# Patient Record
Sex: FEMALE | Race: WHITE | NOT HISPANIC OR LATINO | URBAN - METROPOLITAN AREA
[De-identification: names, ages, dates, MRNs, and addresses within clinical notes are randomized per-mention and may not be internally consistent; named-entity substitution may affect disease eponyms.]

---

## 2020-12-28 ENCOUNTER — OUTPATIENT (OUTPATIENT)
Dept: OUTPATIENT SERVICES | Facility: HOSPITAL | Age: 60
LOS: 1 days | End: 2020-12-28
Payer: COMMERCIAL

## 2020-12-28 DIAGNOSIS — I48.91 UNSPECIFIED ATRIAL FIBRILLATION: ICD-10-CM

## 2020-12-28 PROCEDURE — 93227 XTRNL ECG REC<48 HR R&I: CPT

## 2020-12-30 PROCEDURE — 93225 XTRNL ECG REC<48 HRS REC: CPT

## 2021-09-20 PROBLEM — Z00.00 ENCOUNTER FOR PREVENTIVE HEALTH EXAMINATION: Status: ACTIVE | Noted: 2021-09-20

## 2021-09-24 ENCOUNTER — TRANSCRIPTION ENCOUNTER (OUTPATIENT)
Age: 61
End: 2021-09-24

## 2021-09-24 ENCOUNTER — NON-APPOINTMENT (OUTPATIENT)
Age: 61
End: 2021-09-24

## 2021-09-24 ENCOUNTER — APPOINTMENT (OUTPATIENT)
Dept: HEART AND VASCULAR | Facility: CLINIC | Age: 61
End: 2021-09-24
Payer: COMMERCIAL

## 2021-09-24 VITALS
OXYGEN SATURATION: 98 % | WEIGHT: 228.99 LBS | TEMPERATURE: 98.1 F | BODY MASS INDEX: 31.02 KG/M2 | SYSTOLIC BLOOD PRESSURE: 118 MMHG | DIASTOLIC BLOOD PRESSURE: 84 MMHG | HEIGHT: 72 IN | HEART RATE: 53 BPM

## 2021-09-24 PROCEDURE — 93000 ELECTROCARDIOGRAM COMPLETE: CPT

## 2021-09-24 PROCEDURE — 99203 OFFICE O/P NEW LOW 30 MIN: CPT

## 2021-09-24 NOTE — HISTORY OF PRESENT ILLNESS
[FreeTextEntry1] : 60 F hypothyroidism\par \par Had episode of palpitations Dec 2020 that lasted 4 hours, noted on her pulse ox that her hr was jumping up and down.  Saw a cardiologist in January, did a 48 hour holter no afib or any sustained arrythmias.  No echo or stress testing done.  Feels generalized fatigue, notes this has gotten worse over the last 3 months.  Feels very stressed due to her job and taking care of her brother who has mental illness. Notes chronic chest tightness, thinks it is due to stress.  Notes occasional brief palpitations.  No sob.  Can walk > 2 flights of stairs, no limitations, though says she gets tired and needs to rest after 3 flights.  \par \par Fhx: Mother CHF, Father CAD age 60\par Non smoker\par Social etoh use\par \par NKDA

## 2021-09-24 NOTE — ASSESSMENT
[FreeTextEntry1] : 60 F \par \par Palpitations \par Sinus Genny\par Mild obesity\par EKG: sinus genny rate 53, low voltage limb leads\par \par - 72 hour holter \par - ECHO\par - obtain recent blood work\par - encouraged more exercise, attempts at weight loss\par - further testing pending results of echo/holter

## 2021-09-29 ENCOUNTER — APPOINTMENT (OUTPATIENT)
Dept: HEART AND VASCULAR | Facility: CLINIC | Age: 61
End: 2021-09-29
Payer: COMMERCIAL

## 2021-09-29 ENCOUNTER — NON-APPOINTMENT (OUTPATIENT)
Age: 61
End: 2021-09-29

## 2021-09-29 PROCEDURE — 93306 TTE W/DOPPLER COMPLETE: CPT

## 2022-01-24 ENCOUNTER — TRANSCRIPTION ENCOUNTER (OUTPATIENT)
Age: 62
End: 2022-01-24

## 2022-01-24 ENCOUNTER — INPATIENT (INPATIENT)
Facility: HOSPITAL | Age: 62
LOS: 0 days | Discharge: HOME | DRG: 310 | End: 2022-01-24
Attending: INTERNAL MEDICINE | Admitting: INTERNAL MEDICINE
Payer: COMMERCIAL

## 2022-01-24 VITALS
RESPIRATION RATE: 18 BRPM | WEIGHT: 229.94 LBS | DIASTOLIC BLOOD PRESSURE: 78 MMHG | SYSTOLIC BLOOD PRESSURE: 166 MMHG | HEART RATE: 125 BPM | OXYGEN SATURATION: 97 % | TEMPERATURE: 98 F

## 2022-01-24 VITALS — TEMPERATURE: 98 F

## 2022-01-24 DIAGNOSIS — I10 ESSENTIAL (PRIMARY) HYPERTENSION: ICD-10-CM

## 2022-01-24 DIAGNOSIS — E03.9 HYPOTHYROIDISM, UNSPECIFIED: ICD-10-CM

## 2022-01-24 DIAGNOSIS — I48.91 UNSPECIFIED ATRIAL FIBRILLATION: ICD-10-CM

## 2022-01-24 LAB
ALBUMIN SERPL ELPH-MCNC: 4.4 G/DL — SIGNIFICANT CHANGE UP (ref 3.3–5)
ALP SERPL-CCNC: 112 U/L — SIGNIFICANT CHANGE UP (ref 40–120)
ALT FLD-CCNC: 17 U/L — SIGNIFICANT CHANGE UP (ref 10–45)
ANION GAP SERPL CALC-SCNC: 15 MMOL/L — SIGNIFICANT CHANGE UP (ref 5–17)
APTT BLD: 31.5 SEC — SIGNIFICANT CHANGE UP (ref 27.5–35.5)
AST SERPL-CCNC: 21 U/L — SIGNIFICANT CHANGE UP (ref 10–40)
BILIRUB SERPL-MCNC: 0.2 MG/DL — SIGNIFICANT CHANGE UP (ref 0.2–1.2)
BLD GP AB SCN SERPL QL: NEGATIVE — SIGNIFICANT CHANGE UP
BUN SERPL-MCNC: 20 MG/DL — SIGNIFICANT CHANGE UP (ref 7–23)
CALCIUM SERPL-MCNC: 8.9 MG/DL — SIGNIFICANT CHANGE UP (ref 8.4–10.5)
CHLORIDE SERPL-SCNC: 106 MMOL/L — SIGNIFICANT CHANGE UP (ref 96–108)
CK MB CFR SERPL CALC: 2.8 NG/ML — SIGNIFICANT CHANGE UP (ref 0–6.7)
CK SERPL-CCNC: 114 U/L — SIGNIFICANT CHANGE UP (ref 25–170)
CO2 SERPL-SCNC: 21 MMOL/L — LOW (ref 22–31)
CREAT SERPL-MCNC: 1.01 MG/DL — SIGNIFICANT CHANGE UP (ref 0.5–1.3)
GLUCOSE SERPL-MCNC: 159 MG/DL — HIGH (ref 70–99)
HCT VFR BLD CALC: 39.6 % — SIGNIFICANT CHANGE UP (ref 34.5–45)
HGB BLD-MCNC: 13.2 G/DL — SIGNIFICANT CHANGE UP (ref 11.5–15.5)
INR BLD: 0.96 — SIGNIFICANT CHANGE UP (ref 0.88–1.16)
MCHC RBC-ENTMCNC: 29.4 PG — SIGNIFICANT CHANGE UP (ref 27–34)
MCHC RBC-ENTMCNC: 33.3 GM/DL — SIGNIFICANT CHANGE UP (ref 32–36)
MCV RBC AUTO: 88.2 FL — SIGNIFICANT CHANGE UP (ref 80–100)
NRBC # BLD: 0 /100 WBCS — SIGNIFICANT CHANGE UP (ref 0–0)
PLATELET # BLD AUTO: 357 K/UL — SIGNIFICANT CHANGE UP (ref 150–400)
POTASSIUM SERPL-MCNC: 3.4 MMOL/L — LOW (ref 3.5–5.3)
POTASSIUM SERPL-SCNC: 3.4 MMOL/L — LOW (ref 3.5–5.3)
PROT SERPL-MCNC: 7.5 G/DL — SIGNIFICANT CHANGE UP (ref 6–8.3)
PROTHROM AB SERPL-ACNC: 11.5 SEC — SIGNIFICANT CHANGE UP (ref 10.6–13.6)
RBC # BLD: 4.49 M/UL — SIGNIFICANT CHANGE UP (ref 3.8–5.2)
RBC # FLD: 12.9 % — SIGNIFICANT CHANGE UP (ref 10.3–14.5)
RH IG SCN BLD-IMP: POSITIVE — SIGNIFICANT CHANGE UP
SARS-COV-2 RNA SPEC QL NAA+PROBE: NEGATIVE — SIGNIFICANT CHANGE UP
SODIUM SERPL-SCNC: 142 MMOL/L — SIGNIFICANT CHANGE UP (ref 135–145)
TROPONIN T SERPL-MCNC: 0.01 NG/ML — SIGNIFICANT CHANGE UP (ref 0–0.01)
TSH SERPL-MCNC: 0.12 UIU/ML — LOW (ref 0.27–4.2)
WBC # BLD: 8.54 K/UL — SIGNIFICANT CHANGE UP (ref 3.8–10.5)
WBC # FLD AUTO: 8.54 K/UL — SIGNIFICANT CHANGE UP (ref 3.8–10.5)

## 2022-01-24 PROCEDURE — 82553 CREATINE MB FRACTION: CPT

## 2022-01-24 PROCEDURE — 85027 COMPLETE CBC AUTOMATED: CPT

## 2022-01-24 PROCEDURE — 86900 BLOOD TYPING SEROLOGIC ABO: CPT

## 2022-01-24 PROCEDURE — 80061 LIPID PANEL: CPT

## 2022-01-24 PROCEDURE — 86850 RBC ANTIBODY SCREEN: CPT

## 2022-01-24 PROCEDURE — 71045 X-RAY EXAM CHEST 1 VIEW: CPT | Mod: 26

## 2022-01-24 PROCEDURE — 93010 ELECTROCARDIOGRAM REPORT: CPT

## 2022-01-24 PROCEDURE — 93005 ELECTROCARDIOGRAM TRACING: CPT

## 2022-01-24 PROCEDURE — 99238 HOSP IP/OBS DSCHRG MGMT 30/<: CPT

## 2022-01-24 PROCEDURE — 71045 X-RAY EXAM CHEST 1 VIEW: CPT

## 2022-01-24 PROCEDURE — 99291 CRITICAL CARE FIRST HOUR: CPT

## 2022-01-24 PROCEDURE — 86901 BLOOD TYPING SEROLOGIC RH(D): CPT

## 2022-01-24 PROCEDURE — 82550 ASSAY OF CK (CPK): CPT

## 2022-01-24 PROCEDURE — 99285 EMERGENCY DEPT VISIT HI MDM: CPT

## 2022-01-24 PROCEDURE — 84484 ASSAY OF TROPONIN QUANT: CPT

## 2022-01-24 PROCEDURE — 84439 ASSAY OF FREE THYROXINE: CPT

## 2022-01-24 PROCEDURE — 36415 COLL VENOUS BLD VENIPUNCTURE: CPT

## 2022-01-24 PROCEDURE — 84443 ASSAY THYROID STIM HORMONE: CPT

## 2022-01-24 PROCEDURE — 87635 SARS-COV-2 COVID-19 AMP PRB: CPT

## 2022-01-24 PROCEDURE — 85610 PROTHROMBIN TIME: CPT

## 2022-01-24 PROCEDURE — 85730 THROMBOPLASTIN TIME PARTIAL: CPT

## 2022-01-24 PROCEDURE — 80053 COMPREHEN METABOLIC PANEL: CPT

## 2022-01-24 PROCEDURE — 83036 HEMOGLOBIN GLYCOSYLATED A1C: CPT

## 2022-01-24 RX ORDER — HEPARIN SODIUM 5000 [USP'U]/ML
4000 INJECTION INTRAVENOUS; SUBCUTANEOUS EVERY 6 HOURS
Refills: 0 | Status: DISCONTINUED | OUTPATIENT
Start: 2022-01-24 | End: 2022-01-24

## 2022-01-24 RX ORDER — POTASSIUM CHLORIDE 20 MEQ
40 PACKET (EA) ORAL ONCE
Refills: 0 | Status: COMPLETED | OUTPATIENT
Start: 2022-01-24 | End: 2022-01-24

## 2022-01-24 RX ORDER — HEPARIN SODIUM 5000 [USP'U]/ML
8500 INJECTION INTRAVENOUS; SUBCUTANEOUS EVERY 6 HOURS
Refills: 0 | Status: DISCONTINUED | OUTPATIENT
Start: 2022-01-24 | End: 2022-01-24

## 2022-01-24 RX ORDER — LOSARTAN POTASSIUM 100 MG/1
25 TABLET, FILM COATED ORAL DAILY
Refills: 0 | Status: DISCONTINUED | OUTPATIENT
Start: 2022-01-24 | End: 2022-01-24

## 2022-01-24 RX ORDER — METOPROLOL TARTRATE 50 MG
5 TABLET ORAL ONCE
Refills: 0 | Status: COMPLETED | OUTPATIENT
Start: 2022-01-24 | End: 2022-01-24

## 2022-01-24 RX ORDER — AMLODIPINE BESYLATE 2.5 MG/1
5 TABLET ORAL EVERY 24 HOURS
Refills: 0 | Status: DISCONTINUED | OUTPATIENT
Start: 2022-01-24 | End: 2022-01-24

## 2022-01-24 RX ORDER — APIXABAN 2.5 MG/1
5 TABLET, FILM COATED ORAL EVERY 12 HOURS
Refills: 0 | Status: DISCONTINUED | OUTPATIENT
Start: 2022-01-24 | End: 2022-01-24

## 2022-01-24 RX ORDER — HEPARIN SODIUM 5000 [USP'U]/ML
INJECTION INTRAVENOUS; SUBCUTANEOUS
Qty: 25000 | Refills: 0 | Status: DISCONTINUED | OUTPATIENT
Start: 2022-01-24 | End: 2022-01-24

## 2022-01-24 RX ORDER — LEVOTHYROXINE SODIUM 125 MCG
175 TABLET ORAL EVERY 24 HOURS
Refills: 0 | Status: DISCONTINUED | OUTPATIENT
Start: 2022-01-24 | End: 2022-01-24

## 2022-01-24 RX ORDER — METOPROLOL TARTRATE 50 MG
0.5 TABLET ORAL
Qty: 30 | Refills: 0
Start: 2022-01-24 | End: 2022-02-22

## 2022-01-24 RX ORDER — METOPROLOL TARTRATE 50 MG
12.5 TABLET ORAL
Refills: 0 | Status: DISCONTINUED | OUTPATIENT
Start: 2022-01-24 | End: 2022-01-24

## 2022-01-24 RX ORDER — HEPARIN SODIUM 5000 [USP'U]/ML
8500 INJECTION INTRAVENOUS; SUBCUTANEOUS ONCE
Refills: 0 | Status: COMPLETED | OUTPATIENT
Start: 2022-01-24 | End: 2022-01-24

## 2022-01-24 RX ORDER — SODIUM CHLORIDE 9 MG/ML
1000 INJECTION INTRAMUSCULAR; INTRAVENOUS; SUBCUTANEOUS ONCE
Refills: 0 | Status: COMPLETED | OUTPATIENT
Start: 2022-01-24 | End: 2022-01-24

## 2022-01-24 RX ORDER — ATORVASTATIN CALCIUM 80 MG/1
20 TABLET, FILM COATED ORAL AT BEDTIME
Refills: 0 | Status: DISCONTINUED | OUTPATIENT
Start: 2022-01-24 | End: 2022-01-24

## 2022-01-24 RX ORDER — ATORVASTATIN CALCIUM 80 MG/1
1 TABLET, FILM COATED ORAL
Qty: 30 | Refills: 0
Start: 2022-01-24 | End: 2022-02-22

## 2022-01-24 RX ORDER — LOSARTAN POTASSIUM 100 MG/1
1 TABLET, FILM COATED ORAL
Qty: 30 | Refills: 0
Start: 2022-01-24 | End: 2022-02-22

## 2022-01-24 RX ORDER — APIXABAN 2.5 MG/1
1 TABLET, FILM COATED ORAL
Qty: 60 | Refills: 0
Start: 2022-01-24 | End: 2022-02-22

## 2022-01-24 RX ADMIN — HEPARIN SODIUM 1800 UNIT(S)/HR: 5000 INJECTION INTRAVENOUS; SUBCUTANEOUS at 04:39

## 2022-01-24 RX ADMIN — HEPARIN SODIUM 8500 UNIT(S): 5000 INJECTION INTRAVENOUS; SUBCUTANEOUS at 04:38

## 2022-01-24 RX ADMIN — Medication 175 MICROGRAM(S): at 07:53

## 2022-01-24 RX ADMIN — SODIUM CHLORIDE 1000 MILLILITER(S): 9 INJECTION INTRAMUSCULAR; INTRAVENOUS; SUBCUTANEOUS at 04:38

## 2022-01-24 RX ADMIN — LOSARTAN POTASSIUM 25 MILLIGRAM(S): 100 TABLET, FILM COATED ORAL at 07:53

## 2022-01-24 RX ADMIN — Medication 40 MILLIEQUIVALENT(S): at 06:15

## 2022-01-24 RX ADMIN — APIXABAN 5 MILLIGRAM(S): 2.5 TABLET, FILM COATED ORAL at 07:53

## 2022-01-24 RX ADMIN — Medication 5 MILLIGRAM(S): at 04:37

## 2022-01-24 NOTE — ED ADULT NURSE NOTE - OBJECTIVE STATEMENT
61 y F, complaining of palpitations, pt states she woke up about 1 hour ago and began to feel palpitations, pt states she continues to feel palpitations and this has occurred before. pt states she follows cardiologist in [place] and was told she has Afib but not given any blood thinner medications. pt states she was prescribed many cardiac meds but her heart rate became to low and she stopped taking medications. Pt denies any chest pain but states she just feels palpitations

## 2022-01-24 NOTE — H&P ADULT - NSHPLABSRESULTS_GEN_ALL_CORE
13.2   8.54  )-----------( 357      ( 24 Jan 2022 03:49 )             39.6       01-24    142  |  106  |  20  ----------------------------<  159<H>  3.4<L>   |  21<L>  |  1.01    Ca    8.9      24 Jan 2022 03:49    TPro  7.5  /  Alb  4.4  /  TBili  0.2  /  DBili  x   /  AST  21  /  ALT  17  /  AlkPhos  112  01-24      PT/INR - ( 24 Jan 2022 04:03 )   PT: 11.5 sec;   INR: 0.96          PTT - ( 24 Jan 2022 04:03 )  PTT:31.5 sec    CARDIAC MARKERS ( 24 Jan 2022 03:49 )  x     / 0.01 ng/mL / 114 U/L / x     / 2.8 ng/mL    EKG: Afib 116bpm, nonspecific ST-T wave abnormality

## 2022-01-24 NOTE — DISCHARGE NOTE PROVIDER - NSDCCPCAREPLAN_GEN_ALL_CORE_FT
PRINCIPAL DISCHARGE DIAGNOSIS  Diagnosis: Afib  Assessment and Plan of Treatment: - You came into the hospital with complaints of shortness of breath and palpitations. You were found to be in a fast and irregular heart rhythm called atrial fibrillation. In people with A-fib, the electrical signals that control the heartbeat are abnormal. As a result, the top 2 chambers of the heart stop pumping effectively, and a small amount of the blood that should move out of these chambers gets left behind. As the blood pools, it can start to form clots. These clots can travel to the brain through the blood vessels, and cause strokes.  - You are to start Eliquis 5mg twice a day to prevent blood clots from forming. You will also start Metoprolol tartrate 12.5mg twice a day to maintain a normal heart rate.   - Please call and schedule an appointment to follow up with Dr. Miranda within 2 weeks of discharge from the hospital.      SECONDARY DISCHARGE DIAGNOSES  Diagnosis: Hypertension  Assessment and Plan of Treatment:      PRINCIPAL DISCHARGE DIAGNOSIS  Diagnosis: Afib  Assessment and Plan of Treatment: - You came into the hospital with complaints of shortness of breath and palpitations. You were found to be in a fast and irregular heart rhythm called atrial fibrillation. In people with A-fib, the electrical signals that control the heartbeat are abnormal. As a result, the top 2 chambers of the heart stop pumping effectively, and a small amount of the blood that should move out of these chambers gets left behind. As the blood pools, it can start to form clots. These clots can travel to the brain through the blood vessels, and cause strokes.  - You are to start Eliquis 5mg twice a day to prevent blood clots from forming. You will also start Metoprolol tartrate 12.5mg twice a day to maintain a normal heart rate.   - Please call and schedule an appointment to follow up with Dr. Miranda within 2 weeks of discharge from the hospital.      SECONDARY DISCHARGE DIAGNOSES  Diagnosis: Hypertension  Assessment and Plan of Treatment: - Hypertension, commonly called high blood pressure, is when the force of blood pumping through your arteries is too strong. Hypertension forces your heart to work harder to pump blood. Your arteries may become narrow or stiff. Having untreated or uncontrolled hypertension for a long period of time can cause heart attack, stroke, kidney disease, and other problems.   - Your blood pressure was elevated when you came into the hospital. For this reason you were started on Losartan 25mg daily. Be sure to follow a low salt diet. If you have been prescribed antihypertensive medications to control your blood pressure, be sure to take them every day as prescribed and do not miss any doses, the medications do not work if they are not taken consistently.    Diagnosis: Hyperlipidemia  Assessment and Plan of Treatment: - Your LDL is 133 and your goal LDL is less than 70. LDL is also known as "bad cholesterol" because it takes cholesterol to your arteries, where it may collect in artery walls. Too much cholesterol in your arteries may lead to a buildup of plaque known as atherosclerosis and contribute to heart disease. You were started on Atorvastatin 20mg. Please continue this medication as prescribed.

## 2022-01-24 NOTE — H&P ADULT - PROBLEM SELECTOR PLAN 3
-TSH 0.121  -f/u free T4 and T3  -continue home synthroid 175mcg for now  -outpatient Endo follow up    #DVT PPx- heparin gtt    F- none  E- replete to K>4, mag>2  N- NPO, then DASH diet    Case d/w Cardiology Fellow

## 2022-01-24 NOTE — H&P ADULT - PROBLEM SELECTOR PLAN 2
-TSH 0.121  -f/u free T4 and T3  -continue home synthroid 175mcg for now  -outpatient Endo follow up    #DVT PPx- heparin gtt    F- none  E- replete to K>4, mag>2  N- NPO, then DASH diet    Case d/w Cardiology Fellow -pt reports borderline HTN, not on meds  --160s   -started amlodipine 5mg daily

## 2022-01-24 NOTE — ED PROVIDER NOTE - NS ED ROS FT
General: no fever, chills, confusion  Cardiac: REPORTS PALPITATIONS AND CHEST DISCOMFORT  Lungs: no sob, difficulty breathing  Abdomen: no abdominal pain, nausea, vomiting, diarrhea, constipation, nml BM  : no dysuria, urinary frequency/urgency    All other systems negative except as per HPI

## 2022-01-24 NOTE — DISCHARGE NOTE NURSING/CASE MANAGEMENT/SOCIAL WORK - PATIENT PORTAL LINK FT
You can access the FollowMyHealth Patient Portal offered by [facility] by registering at the following website: [email or web address]. By joining [facility]’s FollowMyHealth portal, you will also be able to view your health information using other applications (apps) compatible with our system.

## 2022-01-24 NOTE — H&P ADULT - ASSESSMENT
62 y/o female with PMHx hypothyroidism, borderline HTN, obesity, presented to [facility] ED 1/24 AM c/o palpitations x1 hour, admitted to cardiac tele for symptomatic AFib RVR with initial plan for DELMY/DCCV, now in sinus rhythm upon arrival on 5 [facility].

## 2022-01-24 NOTE — DISCHARGE NOTE PROVIDER - CARE PROVIDER_API CALL
Tenzin Miarnda (MD)  Cardiovascular Disease; Internal Medicine  Cardiology [facility], [address] [place] [address]  Phone: [phone]  Fax: [phone]  Follow Up Time: 2 weeks

## 2022-01-24 NOTE — H&P ADULT - ATTENDING COMMENTS
Pt converted to NSR  Previously seen by Dr Bermeo, s/p echo  Will treat BP  Home later today, Monday

## 2022-01-24 NOTE — H&P ADULT - NSICDXPASTMEDICALHX_GEN_ALL_CORE_FT
PAST MEDICAL HISTORY:  Hypothyroidism      PAST MEDICAL HISTORY:  Borderline hypertension     Hypothyroidism     Obesity

## 2022-01-24 NOTE — H&P ADULT - NSHPSOCIALHISTORY_GEN_ALL_CORE
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

## 2022-01-24 NOTE — H&P ADULT - NSHPPHYSICALEXAM_GEN_ALL_CORE
Vital Signs Last 24 Hrs  T(C): 36.6 (24 Jan 2022 04:43), Max: 36.6 (24 Jan 2022 04:43)  T(F): 97.8 (24 Jan 2022 04:43), Max: 97.8 (24 Jan 2022 04:43)  HR: 106 (24 Jan 2022 04:43) (106 - 125)  BP: 155/70 (24 Jan 2022 04:43) (155/70 - 166/78)  RR: 18 (24 Jan 2022 04:43) (18 - 18)  SpO2: 98% (24 Jan 2022 04:43) (97% - 98%) Vital Signs Last 24 Hrs  T(C): 36.6 (24 Jan 2022 04:43), Max: 36.6 (24 Jan 2022 04:43)  T(F): 97.8 (24 Jan 2022 04:43), Max: 97.8 (24 Jan 2022 04:43)  HR: 66 (24 Jan 2022 05:40) (66 - 125)  BP: 153/73 (24 Jan 2022 05:40) (153/73 - 166/78)  BP(mean): 99 (24 Jan 2022 05:40) (99 - 99)  RR: 18 (24 Jan 2022 05:40) (18 - 18)  SpO2: 99% (24 Jan 2022 05:40) (97% - 99%)

## 2022-01-24 NOTE — DISCHARGE NOTE NURSING/CASE MANAGEMENT/SOCIAL WORK - NSDCPEFALRISK_GEN_ALL_CORE
For information on Fall & Injury Prevention, visit: [email or web address] OR  [email or web address] OR  https://www.cdc.gov/steadi/patient.html

## 2022-01-24 NOTE — ED PROVIDER NOTE - OBJECTIVE STATEMENT
60 y/o female with a PMHx of hypothyroidism (levothyroxine) is here in the ED c/o palpitations an hour prior to ED arrival. Pt states she suddenly felt her heart pounding and started to experience chest discomfort along with STEPHENSON. She had a similar episode that occurred in the past twice when she was diagnosed with afib, placed on metoprolol and plavix for one week and discontinued as pt states her hr fell to the 40's. She denies the following: recent illness, fever, chills, cough, sob, dizziness, syncope, recent fall/injury, new medication, numbness/tingling to extremities, smoking hx. Pt states she had a recent holter monitor and echo sep 2021 with negative findings.

## 2022-01-24 NOTE — ED PROVIDER NOTE - CLINICAL SUMMARY MEDICAL DECISION MAKING FREE TEXT BOX
62 y/o female with a PMHx of hypothyroidism here in the ED c/o palpitations and found to be in acute onset of afib. Pt hemodynamically stable. Plan for labs, cxr and likely admit to cards. 62 y/o female with a PMHx of hypothyroidism here in the ED c/o palpitations and found to be in acute onset of afib. Pt hemodynamically stable. Plan for labs, cxr and likely admit to cards. Discussed with cards, plan for admission, npo, and cardioversion in the am.

## 2022-01-24 NOTE — H&P ADULT - PROBLEM SELECTOR PLAN 1
-palpitations for about 1 year; 72 hr holter monitor in September 2021 did not show Afib  -HR currently 100s in Afib  -last echo 9/29/21- EF 60-65%, mild-mod MR, normal LA size  -s/p lopressor 5mg IV in ER  -started on heparin gtt  -EP consult for DELMY/DCCV, will keep NPO  -Follow up with Cardiologist Dr. Bermeo upon d/c -presented with palpitations that woke her from sleep, similar to an episode 1 year ago when her pulse ox showed variable pulse rate (presumed to be Afib at that time)  -Currently sinus genny 50s after receiving lopressor 5mg IV x1 in ER  -last echo 9/29/21- EF 60-65%, mild-mod MR, normal LA size  -TTE ordered  -CHADSVASC: 2  -started on heparin gtt, transition to DOAC  -holding further beta blockers due to sinus bradycardia in 50s  -EP consult, candidate for ablation  -Follow up with Cardiologist Dr. Gates upon d/c -presented with palpitations that woke her from sleep, similar to an episode 1 year ago when her pulse ox showed variable pulse rate (presumed to be Afib at that time)  -Currently sinus genny 50s after receiving lopressor 5mg IV x1 in ER  -last echo 9/29/21- EF 60-65%, mild-mod MR, normal LA size  -no evidence of infection  -trop negative  -TSH 0.121; f/u T4/T3  -TTE ordered  -CHADSVASC: 2  -started on heparin gtt, transition to DOAC  -holding further beta blockers due to sinus bradycardia in 50s  -EP consult, candidate for ablation  -Follow up with Cardiologist Dr. Gates upon d/c

## 2022-01-24 NOTE — DISCHARGE NOTE PROVIDER - HOSPITAL COURSE
61 y/oF, PMHx hypothyroidism, borderline HTN, obesity, presented to [facility] ED 1/24 w/palpitations. In ED, VS: afebrile, , /78, RR 18, SPO2 97%. Labs notable for negative cardiac enzymes, TSH 0.121, CBC WNL, K 3.4, BMP otherwise unremarkable. EKG Afib 116bpm, nonspecific ST-T wave abnormality. Pt received lopressor 5mg IV x1, 1L NS bolus, and started on heparin gtt. Admitted to cardiac tele for management of AFib RVR.     On route to , patient self converted and has remained SB-SR. IV hep gtt dc'd and Eliquis 5mg BID initiated. Patient remains hemodynamically stable and as d/w Dr. Miranda will be discharged home today. On the day of discharge, the patient was seen and examined. Symptoms improved. Vital signs are stable. Labs and imaging reviewed. Patient is medically optimized and hemodynamically stable. Return precautions discussed, medication teach back done w/ patient and importance of physician followup emphasized. The family verbalized understanding.      DC Meds:      61 y/oF, PMHx hypothyroidism, borderline HTN, obesity, presented to [facility] ED 1/24 w/palpitations. In ED, VS: afebrile, , /78, RR 18, SPO2 97%. Labs notable for negative cardiac enzymes, TSH 0.121, CBC WNL, K 3.4, BMP otherwise unremarkable. EKG Afib 116bpm, nonspecific ST-T wave abnormality. Pt received lopressor 5mg IV x1, 1L NS bolus, and started on heparin gtt. Admitted to cardiac tele for management of AFib RVR.     On route to , patient self converted and has remained SB-SR. IV hep gtt dc'd and Eliquis 5mg BID initiated. Patient remains hemodynamically stable and as d/w Dr. Miranda will be discharged home today. On the day of discharge, the patient was seen and examined. Symptoms improved. Vital signs are stable. Labs and imaging reviewed. Patient is medically optimized and hemodynamically stable. Return precautions discussed, medication teach back done w/ patient and importance of physician followup emphasized. The family verbalized understanding.      DC Meds:     Atorvastatin 20mg  Cozaar 25mg   Eliquis 5mg BID  Metoprolol tartrate 12.5mg BID

## 2022-01-24 NOTE — PATIENT PROFILE ADULT - FALL HARM RISK - HARM RISK INTERVENTIONS

## 2022-01-24 NOTE — H&P ADULT - NSICDXFAMILYHX_GEN_ALL_CORE_FT
FAMILY HISTORY:  Father  Still living? Unknown  FH: heart attack, Age at diagnosis: 61-70    Mother  Still living? Unknown  FH: CHF (congestive heart failure), Age at diagnosis: 71-80

## 2022-01-24 NOTE — ED PROVIDER NOTE - ATTENDING CONTRIBUTION TO CARE
61 year old female with history of hypothyroidism presents to ED with concern for palpitations that began apx 1 hour prior to arrival in ED.  Patient notes associated slight chest discomfort and headache.  Patient denies associated fever, chills, dizziness, visual changes, shortness of breath, abdominal pain, nausea, emesis, changes to bowel movements, peripheral edema, calf pain/tenderness, recent travel, known sick contacts or any additional acute complaints or concerns at this time.  On my face to face ED eval, patient is non toxic.  Heart with irregularly irregular rhythm, tachy.  Lungs clear.  No peripheral edema.  EKG concerning for atrial fibrillation with RVR.  Patient notes history of afib in the past, though is not on any rate control and A/C.  Given dose of metoprolol, heparin full a/c ordered and plan for cardio admission.

## 2022-01-24 NOTE — ED PROVIDER NOTE - PHYSICAL EXAMINATION
CONSTITUTIONAL: Well-developed; well-nourished; in no acute distress.  SKIN: Warm and dry, no acute rash.  HEAD: Normocephalic; atraumatic.  EYES: PERRL, EOM intact; conjunctiva and sclera clear.  ENT: No nasal discharge; airway clear.  NECK: Supple; non tender.  CARD: S1, S2, ABNORMAL RHYTHM/RATE   RESP: No wheezes, rales or rhonchi.  ABD: Normal bowel sounds; soft; non-distended; non-tender; no hepatosplenomegaly.  EXT: Normal ROM. No clubbing, cyanosis or edema.  LYMPH: No acute cervical adenopathy.  NEURO: Alert, oriented. Grossly unremarkable.  PSYCH: Cooperative, appropriate.

## 2022-01-24 NOTE — H&P ADULT - HISTORY OF PRESENT ILLNESS
INCOMPLETE    60 y/o female with PMHx hypothyroidism presented to [facility] ED 1/24 AM c/o palpitations x1 hour. Pt states she suddenly felt her heart pounding and started to experience chest discomfort along with STEPHENSON. She had a similar episode that occurred in the past twice when she was diagnosed with afib, placed on metoprolol and plavix for one week and discontinued as pt states her HR fell to the 40's. She denies the following: recent illness, fever, chills, cough, sob, dizziness, syncope, recent fall/injury, new medication, numbness/tingling to extremities, smoking hx. Pt states she had a recent holter monitor and echo sep 2021 with negative findings.    In the ER, patient HD stable, afebrile, , /78, RR 18, SPO2 97%. Labs notable for negative cardiac enzymes, TSH 0.121, CBC WNL, K 3.4, BMP otherwise unremarkable. EKG AFib 116bpm, NSST changes. Pt received lopressor 5mg IV x1, 1L NS bolus, and started on heparin gtt.   Admitted to cardiac tele for management of AFib RVR.  INCOMPLETE    62 y/o female with PMHx hypothyroidism presented to [facility] ED 1/24 AM c/o palpitations x1 hour. Pt states she suddenly felt her heart pounding and started to experience chest discomfort along with STEPHENSON. She had a similar episode that occurred in the past twice when she was diagnosed with afib, placed on metoprolol and plavix for one week and discontinued as pt states her HR fell to the 40's. She denies the following: recent illness, fever, chills, cough, sob, dizziness, syncope, recent fall/injury, new medication, numbness/tingling to extremities, smoking hx. Pt states she had a recent holter monitor and echo sep 2021 with negative findings.    In the ER, patient HD stable, afebrile, , /78, RR 18, SPO2 97%. Labs notable for negative cardiac enzymes, TSH 0.121, CBC WNL, K 3.4, BMP otherwise unremarkable. EKG Afib 116bpm, nonspecific ST-T wave abnormality. Pt received lopressor 5mg IV x1, 1L NS bolus, and started on heparin gtt.   Admitted to cardiac tele for management of AFib RVR.  62 y/o female with PMHx hypothyroidism, borderline HTN, obesity, presented to [facility] ED 1/24 AM c/o palpitations x1 hour. Pt states she woke up and felt her heart pounding and started to experience chest discomfort along with STEPHENSON. She checked her Apple Watch which read Afib. She had a similar episode that occurred about 1 year ago, and her pulse ox showed variable pulse rate. At that time she discussed this with her cardiologist Dr. Gates, who placed the pt on metoprolol (which was subsequently stopped due to HR in 40s) and plavix (which was later stopped). She c/o ALFRED with 3 flights of stairs for several months. Denies CP, SOB at rest, palpitations, orthopnea/PND, leg swelling, LOC, bleeding, melena/hematochezia, fever, chills, URI symptoms, urinary complaints, N/V/D, or recent illness.     In the ER, patient HD stable, afebrile, , /78, RR 18, SPO2 97%. Labs notable for negative cardiac enzymes, TSH 0.121, CBC WNL, K 3.4, BMP otherwise unremarkable. EKG Afib 116bpm, nonspecific ST-T wave abnormality. Pt received lopressor 5mg IV x1, 1L NS bolus, and started on heparin gtt.   Admitted to cardiac tele for management of AFib RVR.

## 2022-01-24 NOTE — DISCHARGE NOTE PROVIDER - NSDCMRMEDTOKEN_GEN_ALL_CORE_FT
Eliquis 5 mg oral tablet: 1 tab(s) orally 2 times a day   levothyroxine 175 mcg (0.175 mg) oral tablet: 1 tab(s) orally once a day   atorvastatin 20 mg oral tablet: 1 tab(s) orally once a day (at bedtime)  Cozaar 25 mg oral tablet: 1 tab(s) orally once a day  Eliquis 5 mg oral tablet: 1 tab(s) orally 2 times a day   levothyroxine 175 mcg (0.175 mg) oral tablet: 1 tab(s) orally once a day  Metoprolol Tartrate 25 mg oral tablet: 0.5 tab(s) orally 2 times a day

## 2022-01-25 PROBLEM — E03.9 HYPOTHYROIDISM, UNSPECIFIED: Chronic | Status: ACTIVE | Noted: 2022-01-24

## 2022-01-25 PROBLEM — R03.0 ELEVATED BLOOD-PRESSURE READING, WITHOUT DIAGNOSIS OF HYPERTENSION: Chronic | Status: ACTIVE | Noted: 2022-01-24

## 2022-01-25 PROBLEM — E66.9 OBESITY, UNSPECIFIED: Chronic | Status: ACTIVE | Noted: 2022-01-24

## 2022-01-29 DIAGNOSIS — E03.9 HYPOTHYROIDISM, UNSPECIFIED: ICD-10-CM

## 2022-01-29 DIAGNOSIS — I10 ESSENTIAL (PRIMARY) HYPERTENSION: ICD-10-CM

## 2022-01-29 DIAGNOSIS — I48.91 UNSPECIFIED ATRIAL FIBRILLATION: ICD-10-CM

## 2022-01-29 DIAGNOSIS — Z79.899 OTHER LONG TERM (CURRENT) DRUG THERAPY: ICD-10-CM

## 2022-01-29 DIAGNOSIS — E78.5 HYPERLIPIDEMIA, UNSPECIFIED: ICD-10-CM

## 2022-01-29 DIAGNOSIS — E66.9 OBESITY, UNSPECIFIED: ICD-10-CM

## 2022-02-09 ENCOUNTER — APPOINTMENT (OUTPATIENT)
Dept: HEART AND VASCULAR | Facility: CLINIC | Age: 62
End: 2022-02-09
Payer: COMMERCIAL

## 2022-02-09 ENCOUNTER — NON-APPOINTMENT (OUTPATIENT)
Age: 62
End: 2022-02-09

## 2022-02-09 VITALS
WEIGHT: 231 LBS | BODY MASS INDEX: 31.29 KG/M2 | DIASTOLIC BLOOD PRESSURE: 81 MMHG | OXYGEN SATURATION: 96 % | SYSTOLIC BLOOD PRESSURE: 102 MMHG | HEIGHT: 72 IN | HEART RATE: 58 BPM | TEMPERATURE: 97.9 F

## 2022-02-09 DIAGNOSIS — Z78.9 OTHER SPECIFIED HEALTH STATUS: ICD-10-CM

## 2022-02-09 DIAGNOSIS — Z60.2 PROBLEMS RELATED TO LIVING ALONE: ICD-10-CM

## 2022-02-09 DIAGNOSIS — Z72.3 LACK OF PHYSICAL EXERCISE: ICD-10-CM

## 2022-02-09 PROCEDURE — 93000 ELECTROCARDIOGRAM COMPLETE: CPT

## 2022-02-09 PROCEDURE — 99213 OFFICE O/P EST LOW 20 MIN: CPT

## 2022-02-09 RX ORDER — ADHESIVE TAPE 3"X 2.3 YD
50 MCG TAPE, NON-MEDICATED TOPICAL
Qty: 100 | Refills: 2 | Status: ACTIVE | COMMUNITY
Start: 2022-02-09

## 2022-02-09 SDOH — SOCIAL STABILITY - SOCIAL INSECURITY: PROBLEMS RELATED TO LIVING ALONE: Z60.2

## 2022-02-09 NOTE — ASSESSMENT
[FreeTextEntry1] :  PAF- On Eliquis and BB, I discourage Amio that was given to her elsewhere.  Oblation discussed for both arrhythmias. Currently doing well on BB and Eliquis.\par \par PSVT- Seen on [facility]\par \par Mild to Moderate MR- Seen on Echo.  BP controlled.\par

## 2022-02-09 NOTE — HISTORY OF PRESENT ILLNESS
[FreeTextEntry1] : 60 F hypothyroidism\par \par Had episode of palpitations Dec 2020 that lasted 4 hours, noted on her pulse ox that her hr was jumping up and down.  Saw a cardiologist in January, did a 48 hour holter no afib or any sustained arrhythmias.  No echo or stress testing done.  Feels generalized fatigue, notes this has gotten worse over the last 3 months.  Feels very stressed due to her job and taking care of her brother who has mental illness. Notes chronic chest tightness, thinks it is due to stress.  Notes occasional brief palpitations.  No sob.  Can walk > 2 flights of stairs, no limitations, though says she gets tired and needs to rest after 3 flights.  \par \par Fhx: Mother CHF, Father CAD age 60\par  \par  \par  2/9/22       In [facility] Jan 2022 dx with PAF. also dx with PSVT on [facility].  SShe currently does not exercise but likes to swim and bike in Summer.  Just given Amio by PCP wc she has not started.

## 2022-06-21 ENCOUNTER — APPOINTMENT (OUTPATIENT)
Dept: HEART AND VASCULAR | Facility: CLINIC | Age: 62
End: 2022-06-21
Payer: COMMERCIAL

## 2022-06-21 VITALS
SYSTOLIC BLOOD PRESSURE: 125 MMHG | BODY MASS INDEX: 31.15 KG/M2 | DIASTOLIC BLOOD PRESSURE: 69 MMHG | HEIGHT: 72 IN | OXYGEN SATURATION: 99 % | WEIGHT: 230 LBS | HEART RATE: 60 BPM | TEMPERATURE: 97.3 F

## 2022-06-21 PROCEDURE — 99213 OFFICE O/P EST LOW 20 MIN: CPT

## 2022-06-21 RX ORDER — AMIODARONE HYDROCHLORIDE 200 MG/1
200 TABLET ORAL
Qty: 90 | Refills: 0 | Status: DISCONTINUED | COMMUNITY
Start: 2022-02-08 | End: 2022-06-21

## 2022-06-21 NOTE — HISTORY OF PRESENT ILLNESS
[FreeTextEntry1] : 61 F hypothyroidism\par \par Had episode of palpitations Dec 2020 that lasted 4 hours, noted on her pulse ox that her hr was jumping up and down.  Saw a cardiologist in January, did a 48 hour holter no afib or any sustained arrhythmias.  No echo or stress testing done.  Feels generalized fatigue, notes this has gotten worse over the last 3 months.  Feels very stressed due to her job and taking care of her brother who has mental illness. Notes chronic chest tightness, thinks it is due to stress.  Notes occasional brief palpitations.  No sob.  Can walk > 2 flights of stairs, no limitations, though says she gets tired and needs to rest after 3 flights.  \par \par Fhx: Mother CHF, Father CAD age 60\par  \par  \par  2/9/22       In [facility] Jan 2022 dx with PAF. also dx with PSVT on [facility].  She currently does not exercise but likes to swim and bike in Summer.  Just given Amio by PCP wc she has not started.\par \par 6/21/22 In [facility]

## 2022-06-21 NOTE — ASSESSMENT
[FreeTextEntry1] :  PAF- On Eliquis and BB, I discourage Amio that was given to her elsewhere.  Ablation discussed for both arrhythmias. Currently doing well on BB and Eliquis.\par \par PSVT- Seen on [facility]\par \par Mild to Moderate MR- Seen on Echo Sept 2021.  BP controlled.\par \par Health Maintainence- colonoscopy Oct 2021 with Dr Lopez.\par

## 2022-07-14 ENCOUNTER — EMERGENCY (EMERGENCY)
Facility: HOSPITAL | Age: 62
LOS: 1 days | Discharge: HOME | End: 2022-07-14
Admitting: EMERGENCY MEDICINE

## 2022-07-14 DIAGNOSIS — I10 ESSENTIAL (PRIMARY) HYPERTENSION: ICD-10-CM

## 2022-07-14 DIAGNOSIS — I48.0 PAROXYSMAL ATRIAL FIBRILLATION: ICD-10-CM

## 2022-07-14 DIAGNOSIS — Z79.01 LONG TERM (CURRENT) USE OF ANTICOAGULANTS: ICD-10-CM

## 2022-07-14 DIAGNOSIS — R00.2 PALPITATIONS: ICD-10-CM

## 2022-07-14 PROCEDURE — 99284 EMERGENCY DEPT VISIT MOD MDM: CPT

## 2022-07-14 PROCEDURE — 93010 ELECTROCARDIOGRAM REPORT: CPT

## 2022-08-09 ENCOUNTER — APPOINTMENT (OUTPATIENT)
Dept: HEART AND VASCULAR | Facility: CLINIC | Age: 62
End: 2022-08-09

## 2022-08-09 ENCOUNTER — NON-APPOINTMENT (OUTPATIENT)
Age: 62
End: 2022-08-09

## 2022-08-09 VITALS
BODY MASS INDEX: 31.15 KG/M2 | HEART RATE: 48 BPM | SYSTOLIC BLOOD PRESSURE: 117 MMHG | WEIGHT: 230 LBS | HEIGHT: 72 IN | DIASTOLIC BLOOD PRESSURE: 65 MMHG | TEMPERATURE: 97.2 F

## 2022-08-09 PROCEDURE — 93000 ELECTROCARDIOGRAM COMPLETE: CPT

## 2022-08-09 PROCEDURE — 99204 OFFICE O/P NEW MOD 45 MIN: CPT | Mod: 25

## 2022-08-09 NOTE — HISTORY OF PRESENT ILLNESS
[FreeTextEntry1] : 60 y/o F with h/o Hypothyroidism, HTN, Obesity and paroxysmal atrial fibrillation who presents for evaluation.  \par \par First episode of palpitations occurred three years ago.  She has had three other distinct episodes since that time.  Admitted to [facility] 1/2022 with rapid atrial fibrillation and presented to [facility] in July with rapid AFib as well.  Took extra dose of Metoprolol.  Spontaneously converted to sinus rhythm.  Longest episode 3-4 hours.  Endorses mild fatigue.  No active CP, SOB, dizziness, presyncope/syncope.  \par \par Endorses snoring- no h/o sleep study.  \par \par Labs 7/2022: \par Creat 0.93\par TSH 0.07, T4 1.4

## 2022-08-09 NOTE — PHYSICAL EXAM
[Well Developed] : well developed [Well Nourished] : well nourished [No Acute Distress] : no acute distress [Normal Conjunctiva] : normal conjunctiva [Normal Venous Pressure] : normal venous pressure [No Carotid Bruit] : no carotid bruit [Normal S1, S2] : normal S1, S2 [No Murmur] : no murmur [No Rub] : no rub [No Gallop] : no gallop [5th Left ICS - MCL] : palpated at the 5th LICS in the midclavicular line [Normal] : normal [Bradycardia] : bradycardic [Rhythm Regular] : regular [Clear Lung Fields] : clear lung fields [Good Air Entry] : good air entry [No Respiratory Distress] : no respiratory distress  [Soft] : abdomen soft [Non Tender] : non-tender [No Masses/organomegaly] : no masses/organomegaly [Normal Bowel Sounds] : normal bowel sounds [Normal Gait] : normal gait [No Edema] : no edema [No Cyanosis] : no cyanosis [No Clubbing] : no clubbing [No Varicosities] : no varicosities [No Rash] : no rash [No Skin Lesions] : no skin lesions [Moves all extremities] : moves all extremities [No Focal Deficits] : no focal deficits [Normal Speech] : normal speech [Alert and Oriented] : alert and oriented [Normal memory] : normal memory

## 2022-08-11 ENCOUNTER — RX CHANGE (OUTPATIENT)
Age: 62
End: 2022-08-11

## 2022-08-16 ENCOUNTER — RX CHANGE (OUTPATIENT)
Age: 62
End: 2022-08-16

## 2022-08-21 ENCOUNTER — EMERGENCY (EMERGENCY)
Facility: HOSPITAL | Age: 62
LOS: 1 days | Discharge: HOME | End: 2022-08-21
Attending: EMERGENCY MEDICINE | Admitting: EMERGENCY MEDICINE
Payer: COMMERCIAL

## 2022-08-21 VITALS
OXYGEN SATURATION: 97 % | RESPIRATION RATE: 18 BRPM | HEART RATE: 44 BPM | SYSTOLIC BLOOD PRESSURE: 134 MMHG | TEMPERATURE: 98 F | DIASTOLIC BLOOD PRESSURE: 74 MMHG

## 2022-08-21 VITALS
OXYGEN SATURATION: 97 % | RESPIRATION RATE: 17 BRPM | HEART RATE: 58 BPM | SYSTOLIC BLOOD PRESSURE: 124 MMHG | TEMPERATURE: 98 F | DIASTOLIC BLOOD PRESSURE: 80 MMHG | WEIGHT: 229.94 LBS | HEIGHT: 72 IN

## 2022-08-21 LAB
ALBUMIN SERPL ELPH-MCNC: 4.4 G/DL — SIGNIFICANT CHANGE UP (ref 3.3–5)
ALP SERPL-CCNC: 100 U/L — SIGNIFICANT CHANGE UP (ref 40–120)
ALT FLD-CCNC: 13 U/L — SIGNIFICANT CHANGE UP (ref 10–45)
ANION GAP SERPL CALC-SCNC: 9 MMOL/L — SIGNIFICANT CHANGE UP (ref 5–17)
APTT BLD: 37 SEC — HIGH (ref 27.5–35.5)
AST SERPL-CCNC: 18 U/L — SIGNIFICANT CHANGE UP (ref 10–40)
BASOPHILS # BLD AUTO: 0.04 K/UL — SIGNIFICANT CHANGE UP (ref 0–0.2)
BASOPHILS NFR BLD AUTO: 0.7 % — SIGNIFICANT CHANGE UP (ref 0–2)
BILIRUB SERPL-MCNC: 0.4 MG/DL — SIGNIFICANT CHANGE UP (ref 0.2–1.2)
BUN SERPL-MCNC: 16 MG/DL — SIGNIFICANT CHANGE UP (ref 7–23)
CALCIUM SERPL-MCNC: 9.5 MG/DL — SIGNIFICANT CHANGE UP (ref 8.4–10.5)
CHLORIDE SERPL-SCNC: 106 MMOL/L — SIGNIFICANT CHANGE UP (ref 96–108)
CO2 SERPL-SCNC: 25 MMOL/L — SIGNIFICANT CHANGE UP (ref 22–31)
CREAT SERPL-MCNC: 0.93 MG/DL — SIGNIFICANT CHANGE UP (ref 0.5–1.3)
EGFR: 69 ML/MIN/1.73M2 — SIGNIFICANT CHANGE UP
EOSINOPHIL # BLD AUTO: 0.21 K/UL — SIGNIFICANT CHANGE UP (ref 0–0.5)
EOSINOPHIL NFR BLD AUTO: 3.4 % — SIGNIFICANT CHANGE UP (ref 0–6)
GLUCOSE SERPL-MCNC: 137 MG/DL — HIGH (ref 70–99)
HCT VFR BLD CALC: 39.5 % — SIGNIFICANT CHANGE UP (ref 34.5–45)
HGB BLD-MCNC: 13.4 G/DL — SIGNIFICANT CHANGE UP (ref 11.5–15.5)
IMM GRANULOCYTES NFR BLD AUTO: 0.2 % — SIGNIFICANT CHANGE UP (ref 0–1.5)
INR BLD: 1.41 — HIGH (ref 0.88–1.16)
LYMPHOCYTES # BLD AUTO: 2.09 K/UL — SIGNIFICANT CHANGE UP (ref 1–3.3)
LYMPHOCYTES # BLD AUTO: 34.2 % — SIGNIFICANT CHANGE UP (ref 13–44)
MAGNESIUM SERPL-MCNC: 2 MG/DL — SIGNIFICANT CHANGE UP (ref 1.6–2.6)
MCHC RBC-ENTMCNC: 29.8 PG — SIGNIFICANT CHANGE UP (ref 27–34)
MCHC RBC-ENTMCNC: 33.9 GM/DL — SIGNIFICANT CHANGE UP (ref 32–36)
MCV RBC AUTO: 88 FL — SIGNIFICANT CHANGE UP (ref 80–100)
MONOCYTES # BLD AUTO: 0.45 K/UL — SIGNIFICANT CHANGE UP (ref 0–0.9)
MONOCYTES NFR BLD AUTO: 7.4 % — SIGNIFICANT CHANGE UP (ref 2–14)
NEUTROPHILS # BLD AUTO: 3.32 K/UL — SIGNIFICANT CHANGE UP (ref 1.8–7.4)
NEUTROPHILS NFR BLD AUTO: 54.1 % — SIGNIFICANT CHANGE UP (ref 43–77)
NRBC # BLD: 0 /100 WBCS — SIGNIFICANT CHANGE UP (ref 0–0)
NT-PROBNP SERPL-SCNC: 278 PG/ML — SIGNIFICANT CHANGE UP (ref 0–300)
PLATELET # BLD AUTO: 389 K/UL — SIGNIFICANT CHANGE UP (ref 150–400)
POTASSIUM SERPL-MCNC: 4 MMOL/L — SIGNIFICANT CHANGE UP (ref 3.5–5.3)
POTASSIUM SERPL-SCNC: 4 MMOL/L — SIGNIFICANT CHANGE UP (ref 3.5–5.3)
PROT SERPL-MCNC: 7.4 G/DL — SIGNIFICANT CHANGE UP (ref 6–8.3)
PROTHROM AB SERPL-ACNC: 16.8 SEC — HIGH (ref 10.5–13.4)
RBC # BLD: 4.49 M/UL — SIGNIFICANT CHANGE UP (ref 3.8–5.2)
RBC # FLD: 12.9 % — SIGNIFICANT CHANGE UP (ref 10.3–14.5)
SARS-COV-2 RNA SPEC QL NAA+PROBE: NEGATIVE — SIGNIFICANT CHANGE UP
SODIUM SERPL-SCNC: 140 MMOL/L — SIGNIFICANT CHANGE UP (ref 135–145)
TROPONIN T SERPL-MCNC: 0.01 NG/ML — SIGNIFICANT CHANGE UP (ref 0–0.01)
TROPONIN T SERPL-MCNC: <0.01 NG/ML — SIGNIFICANT CHANGE UP (ref 0–0.01)
TSH SERPL-MCNC: 0.12 UIU/ML — LOW (ref 0.27–4.2)
WBC # BLD: 6.12 K/UL — SIGNIFICANT CHANGE UP (ref 3.8–10.5)
WBC # FLD AUTO: 6.12 K/UL — SIGNIFICANT CHANGE UP (ref 3.8–10.5)

## 2022-08-21 PROCEDURE — 83735 ASSAY OF MAGNESIUM: CPT

## 2022-08-21 PROCEDURE — 80053 COMPREHEN METABOLIC PANEL: CPT

## 2022-08-21 PROCEDURE — 85025 COMPLETE CBC W/AUTO DIFF WBC: CPT

## 2022-08-21 PROCEDURE — 87635 SARS-COV-2 COVID-19 AMP PRB: CPT

## 2022-08-21 PROCEDURE — 83880 ASSAY OF NATRIURETIC PEPTIDE: CPT

## 2022-08-21 PROCEDURE — 36415 COLL VENOUS BLD VENIPUNCTURE: CPT

## 2022-08-21 PROCEDURE — 85610 PROTHROMBIN TIME: CPT

## 2022-08-21 PROCEDURE — 99284 EMERGENCY DEPT VISIT MOD MDM: CPT | Mod: 25

## 2022-08-21 PROCEDURE — 93005 ELECTROCARDIOGRAM TRACING: CPT

## 2022-08-21 PROCEDURE — 84484 ASSAY OF TROPONIN QUANT: CPT

## 2022-08-21 PROCEDURE — 85730 THROMBOPLASTIN TIME PARTIAL: CPT

## 2022-08-21 PROCEDURE — 93010 ELECTROCARDIOGRAM REPORT: CPT | Mod: NC

## 2022-08-21 PROCEDURE — 99285 EMERGENCY DEPT VISIT HI MDM: CPT

## 2022-08-21 PROCEDURE — 96374 THER/PROPH/DIAG INJ IV PUSH: CPT

## 2022-08-21 PROCEDURE — 84443 ASSAY THYROID STIM HORMONE: CPT

## 2022-08-21 PROCEDURE — 82962 GLUCOSE BLOOD TEST: CPT

## 2022-08-21 RX ORDER — SODIUM CHLORIDE 9 MG/ML
1000 INJECTION INTRAMUSCULAR; INTRAVENOUS; SUBCUTANEOUS ONCE
Refills: 0 | Status: COMPLETED | OUTPATIENT
Start: 2022-08-21 | End: 2022-08-21

## 2022-08-21 RX ORDER — METOCLOPRAMIDE HCL 10 MG
10 TABLET ORAL ONCE
Refills: 0 | Status: COMPLETED | OUTPATIENT
Start: 2022-08-21 | End: 2022-08-21

## 2022-08-21 RX ORDER — METOCLOPRAMIDE HCL 10 MG
10 TABLET ORAL ONCE
Refills: 0 | Status: DISCONTINUED | OUTPATIENT
Start: 2022-08-21 | End: 2022-08-21

## 2022-08-21 RX ADMIN — Medication 10 MILLIGRAM(S): at 11:00

## 2022-08-21 RX ADMIN — Medication 104 MILLIGRAM(S): at 10:45

## 2022-08-21 RX ADMIN — SODIUM CHLORIDE 1000 MILLILITER(S): 9 INJECTION INTRAMUSCULAR; INTRAVENOUS; SUBCUTANEOUS at 11:00

## 2022-08-21 RX ADMIN — SODIUM CHLORIDE 2000 MILLILITER(S): 9 INJECTION INTRAMUSCULAR; INTRAVENOUS; SUBCUTANEOUS at 10:45

## 2022-08-21 NOTE — ED PROVIDER NOTE - ST/T WAVE
Anesthesia Start and Stop Event Times     Date Time Event    2/16/2021 1241 Ready for Procedure     1251 Anesthesia Start     1346 Anesthesia Stop        Responsible Staff  02/16/21    Name Role Begin End    Gustavo Eugene M.D. Anesth 1251 1346        Preop Diagnosis (Free Text):  Pre-op Diagnosis     HYDRONEPHROSIS        Preop Diagnosis (Codes):    Post op Diagnosis  Hydronephrosis of left kidney  left ureteral mass    Premium Reason  Non-Premium    Comments:                                                                        no acute changes

## 2022-08-21 NOTE — ED PROVIDER NOTE - PATIENT PORTAL LINK FT
You can access the FollowMyHealth Patient Portal offered by [facility] by registering at the following website: [email or web address]. By joining [employer]’s FollowMyHealth portal, you will also be able to view your health information using other applications (apps) compatible with our system.

## 2022-08-21 NOTE — ED PROVIDER NOTE - CLINICAL SUMMARY MEDICAL DECISION MAKING FREE TEXT BOX
62yo PMHx of hypothyroidism, afib presenting with complaints of palpitations which woke her up at 730 this morning, noted on I watch. Took 10mg propranolol after trying vagal maneuvers then took home morning doses of eliquis, metoprolol, levothyroxine. States walking to ED may have helped sx. Mild chest discomfort, no sob, leg swelling, syncope. Some lightheadedness states from afib. No cough, f/c, abd pain, n/v/d.  ddx: 62yo PMHx of hypothyroidism, afib presenting with complaints of palpitations which woke her up at 730 this morning, noted on I watch. Took 10mg propranolol after trying vagal maneuvers then took home morning doses of eliquis, metoprolol, levothyroxine. States walking to ED may have helped sx. Mild chest discomfort, no sob, leg swelling, syncope. Some lightheadedness states from afib. No cough, f/c, abd pain, n/v/d. States feels dehdyrated.  VS noted for sinus bradycardia, will obtain labs, ivf, reassess. 62yo PMHx of hypothyroidism, afib presenting with complaints of palpitations which woke her up at 730 this morning, noted on I watch. Took 10mg propranolol after trying vagal maneuvers then took home morning doses of eliquis, metoprolol, levothyroxine. States walking to ED may have helped sx. Mild chest discomfort, no sob, leg swelling, syncope. Some lightheadedness states from afib. No cough, f/c, abd pain, n/v/d. States feels dehdyrated.  VS noted for sinus bradycardia- suspect from additional propranolol taken this morning, will obtain labs, ivf, reassess.

## 2022-08-21 NOTE — ED PROVIDER NOTE - NSFOLLOWUPINSTRUCTIONS_ED_ALL_ED_FT
Can take tylenol 650mg every 6hrs as needed for pain.  Follow up with primary doctor within 1-2 days.  Follow up with cardiologist within 1-2 days. Can call [phone] ([facility]) to schedule appointment.   Return to ER for persistent fever/vomit, uncontrolled pain, worsening breathing, worsening lightheaded, focal weakness/numbness.    Nonspecific Chest Pain  Chest pain can be caused by many different conditions. It can be caused by a condition that is life-threatening and requires treatment right away. It can also be caused by something that is not life-threatening. If you have chest pain, it can be hard to know the difference, so it is important to get help right away to make sure that you do not have a serious condition.    Some life-threatening causes of chest pain include:  Heart attack.  A tear in the body's main blood vessel (aortic dissection).  Inflammation around your heart (pericarditis).  A problem in the lungs, such as a blood clot (pulmonary embolism) or a collapsed lung (pneumothorax).    Some non life-threatening causes of chest pain include:  Heartburn.  Anxiety or stress.  Damage to the bones, muscles, and cartilage that make up your chest wall.  Pneumonia or bronchitis.  Shingles infection (varicella-zoster virus).    Chest pain can feel like:  Pain or discomfort on the surface of your chest or deep in your chest.  Crushing, pressure, aching, or squeezing pain.  Burning or tingling.  Dull or sharp pain that is worse when you move, cough, or take a deep breath.  Pain or discomfort that is also felt in your back, neck, jaw, shoulder, or arm, or pain that spreads to any of these areas.  Your chest pain may come and go. It may also be constant. Your health care provider will do lab tests and other studies to find the cause of your pain. Treatment will depend on the cause of your chest pain.    Follow these instructions at home:  Pay attention to any changes in your symptoms. Tell your health care provider about them or any new symptoms. Follow these instructions from your health care provider.    Medicines   Take over-the-counter and prescription medicines only as told by your health care provider.  If you were prescribed an antibiotic, take it as told by your health care provider. Do not stop taking the antibiotic even if you start to feel better.    Lifestyle    Rest as directed by your health care provider.  Do not use any products that contain nicotine or tobacco, such as cigarettes and e-cigarettes. If you need help quitting, ask your health care provider.  Do not drink alcohol.  Make healthy lifestyle choices as recommended. These may include:  Getting regular exercise. Ask your health care provider to suggest some activities that are safe for you.  Eating a heart-healthy diet. This includes plenty of fresh fruits and vegetables, whole grains, low-fat (lean) protein, and low-fat dairy products. A dietitian can help you find healthy eating options.  Maintaining a healthy weight.  Managing any other health conditions you have, such as hypertension or diabetes.  Reducing stress, such as with yoga or relaxation techniques.    General instructions   Avoid any activities that bring on chest pain.  Keep all follow-up visits as told by your health care provider. This is important. This includes visits for any further testing if your chest pain does not go away.    Contact a health care provider if:  Your chest pain does not go away.  You feel depressed.  You have a fever.    Get help right away if:  Your chest pain gets worse.  You have a cough that gets worse, or you cough up blood.  You have severe pain in your abdomen.  You faint.  You have sudden, unexplained chest discomfort.  You have sudden, unexplained discomfort in your arms, back, neck, or jaw.  You have shortness of breath at any time.  You suddenly start to sweat, or your skin gets clammy.  You feel nausea or you vomit.  You suddenly feel lightheaded or dizzy.  You have severe weakness, or unexplained weakness or fatigue.  Your heart begins to beat quickly, or it feels like it is skipping beats.

## 2022-08-21 NOTE — ED ADULT TRIAGE NOTE - HEIGHT IN INCHES
2 Bexarotene Counseling:  I discussed with the patient the risks of bexarotene including but not limited to hair loss, dry lips/skin/eyes, liver abnormalities, hyperlipidemia, pancreatitis, depression/suicidal ideation, photosensitivity, drug rash/allergic reactions, hypothyroidism, anemia, leukopenia, infection, cataracts, and teratogenicity.  Patient understands that they will need regular blood tests to check lipid profile, liver function tests, white blood cell count, thyroid function tests and pregnancy test if applicable.

## 2022-08-21 NOTE — ED PROVIDER NOTE - OBJECTIVE STATEMENT
62yo PMHx of hypothyroidism, afib presenting with complaints of palpitations which woke her up at 730 this morning, noted on I watch. Took 10mg propranolol after trying vagal maneuvers then took home morning doses of eliquis, metoprolol, levothyroxine. States walking to ED may have helped sx. Mild chest discomfort, no sob, leg swelling, syncope. Some lightheadedness states from afib. No cough, f/c, abd pain, n/v/d. 60yo PMHx of hypothyroidism, afib presenting with complaints of palpitations which woke her up at 730 this morning, noted on I watch. Took 10mg propranolol after trying vagal maneuvers then took home morning doses of eliquis, metoprolol, levothyroxine. States walking to ED may have helped sx. Mild chest discomfort, no sob, leg swelling, syncope. Some lightheadedness states from afib. No cough, f/c, abd pain, n/v/d. states heart rate normally runs low in 50s.

## 2022-08-21 NOTE — ED ADULT TRIAGE NOTE - CHIEF COMPLAINT QUOTE
Pt has hx of afib, on metoprol, eliquis, propanolol as needed, reports waking up in the middle of the night with lightheadedness, headache, HR in the 140s on her apple watch. Denies chest pain, sob, any symptoms at this time.

## 2022-08-21 NOTE — ED PROVIDER NOTE - PROGRESS NOTE DETAILS
rpt trop negative, monitored in ED with sinus throughout, states symptomatically feels improved, disc with Dr Miranda, will fu as outpt, strict return prec given.

## 2022-08-21 NOTE — ED ADULT NURSE NOTE - OBJECTIVE STATEMENT
Patient presents to the ED complaining of palpitations waking her up today from her sleep. Patient reports feeling light headed and having a headache.  Patient reports that she took her propanolol and metoprolol prior to coming in.  Patient reports history of a-fib and hypothyroidism. Denies any chest pain or shortness of breath. Patient is scheduled for an ablation in october.

## 2022-08-24 DIAGNOSIS — R07.89 OTHER CHEST PAIN: ICD-10-CM

## 2022-08-24 DIAGNOSIS — I48.91 UNSPECIFIED ATRIAL FIBRILLATION: ICD-10-CM

## 2022-08-24 DIAGNOSIS — E03.9 HYPOTHYROIDISM, UNSPECIFIED: ICD-10-CM

## 2022-08-24 DIAGNOSIS — R00.2 PALPITATIONS: ICD-10-CM

## 2022-08-24 DIAGNOSIS — R42 DIZZINESS AND GIDDINESS: ICD-10-CM

## 2022-08-24 DIAGNOSIS — R00.1 BRADYCARDIA, UNSPECIFIED: ICD-10-CM

## 2022-08-24 DIAGNOSIS — Z20.822 CONTACT WITH AND (SUSPECTED) EXPOSURE TO COVID-19: ICD-10-CM

## 2022-08-29 ENCOUNTER — APPOINTMENT (OUTPATIENT)
Age: 62
End: 2022-08-29

## 2022-08-29 VITALS
DIASTOLIC BLOOD PRESSURE: 69 MMHG | BODY MASS INDEX: 30.61 KG/M2 | SYSTOLIC BLOOD PRESSURE: 108 MMHG | TEMPERATURE: 98 F | WEIGHT: 226 LBS | HEART RATE: 57 BPM | HEIGHT: 72 IN | OXYGEN SATURATION: 97 %

## 2022-08-29 PROCEDURE — 93000 ELECTROCARDIOGRAM COMPLETE: CPT

## 2022-08-29 PROCEDURE — 99213 OFFICE O/P EST LOW 20 MIN: CPT | Mod: 25

## 2022-08-29 NOTE — DISCUSSION/SUMMARY
[FreeTextEntry1] : 1) Paroxysmal atrial fibrillation: symptomatic when it occurs and presently undergoing evaluation for ablation procedure. Continue Eliquis 5 qd. Regarding an alternative to Propranolol suggested a trial of Cardizem CD 30 prn use. Cessation of caffeinated drinks and weight loss discussed at length.  \par 2) Hypertension: controlled, advised to continue Losartan 25 qd. \par 3) Hypothyroid: advised to bring a copy of recent labs, in the interim, continue Levothyroxine 175 qd.  [EKG obtained to assist in diagnosis and management of assessed problem(s)] : EKG obtained to assist in diagnosis and management of assessed problem(s)

## 2022-09-02 ENCOUNTER — APPOINTMENT (OUTPATIENT)
Dept: CT IMAGING | Facility: HOSPITAL | Age: 62
End: 2022-09-02

## 2022-09-02 ENCOUNTER — OUTPATIENT (OUTPATIENT)
Dept: OUTPATIENT SERVICES | Facility: HOSPITAL | Age: 62
LOS: 1 days | End: 2022-09-02
Payer: COMMERCIAL

## 2022-09-02 LAB — POCT ISTAT CREATININE: 1 MG/DL — SIGNIFICANT CHANGE UP (ref 0.5–1.3)

## 2022-09-02 PROCEDURE — 82565 ASSAY OF CREATININE: CPT

## 2022-09-02 PROCEDURE — 75572 CT HRT W/3D IMAGE: CPT | Mod: 26

## 2022-09-02 PROCEDURE — 75572 CT HRT W/3D IMAGE: CPT

## 2022-09-08 NOTE — ED PROVIDER NOTE - NS ED MD DISPO ADMITTING SERVICE
Bedside and Verbal shift change report given to Melissa Jimenze RN by  Adam Villanueva RN Report included the following information SBAR and Kardex. CARDIOLOGY

## 2022-09-26 ENCOUNTER — NON-APPOINTMENT (OUTPATIENT)
Age: 62
End: 2022-09-26

## 2022-09-26 ENCOUNTER — APPOINTMENT (OUTPATIENT)
Dept: HEART AND VASCULAR | Facility: CLINIC | Age: 62
End: 2022-09-26

## 2022-09-26 VITALS
WEIGHT: 227 LBS | DIASTOLIC BLOOD PRESSURE: 78 MMHG | SYSTOLIC BLOOD PRESSURE: 113 MMHG | BODY MASS INDEX: 30.75 KG/M2 | HEART RATE: 51 BPM | OXYGEN SATURATION: 97 % | TEMPERATURE: 98.2 F | HEIGHT: 72 IN

## 2022-09-26 PROCEDURE — 99213 OFFICE O/P EST LOW 20 MIN: CPT | Mod: 25

## 2022-09-26 PROCEDURE — 93000 ELECTROCARDIOGRAM COMPLETE: CPT

## 2022-09-26 NOTE — DISCUSSION/SUMMARY
[FreeTextEntry1] : 1) Paroxysmal atrial fibrillation: EP recommendations appreciated, advised to continue Eliquis for now. Prescription for Cardizem CD 30 mg provided for prn use. Cessation of caffeinated drinks and weight loss discussed at length. \par 2) Hypertension: controlled, advised to continue Losartan 25 qd. \par 3) Hypothyroid: TSH 0.7, coud be causing bradycardia and prolonged QT, advised to decrease Levothyroxine 175 -> 150 qd. Recheck TSH next visit.  [EKG obtained to assist in diagnosis and management of assessed problem(s)] : EKG obtained to assist in diagnosis and management of assessed problem(s)

## 2022-09-26 NOTE — HISTORY OF PRESENT ILLNESS
[FreeTextEntry1] : 62yo F,  with history of hypertension, hypothyroidism and paroxysmal atrial fibrillation on Eliquis returns to clinic for routine follow up. As previously mentioned episodes of Afib are infrequent, but bothersome when they occur. She is presently not taking Propranolol given baseline HR 45-50 bpm. She is scheduled for Afib ablation in the coming weeks. Review of current labs are notable for TSH 0.07 on Levothyroxine 175 qd. \par \par SHx: denies smoking or alcohol. Employed in IT. Lives with independently.\par Allergies: Eggplant, Hazelnut. \par Meds: Metorpolol 12.5 prn, Lveothyroxine 175 qd, Eliquis 5 qd, Propranolol 10 prn, Vit D3 supplements. \par \par Labs: \par 7/22: Na 141, K 5.0, BUN/Cre 17/0.9, Ca 9.8, ALT 14, TSH 0.07, fT4 1.5. \par \par Studies: \par 9/26/22 EKG: sinus, rate 45, normal axis, no q waves, incomplete RBBB, prolonged cQT interval 460ms. \par \par

## 2022-10-04 ENCOUNTER — TRANSCRIPTION ENCOUNTER (OUTPATIENT)
Age: 62
End: 2022-10-04

## 2022-10-17 ENCOUNTER — APPOINTMENT (OUTPATIENT)
Dept: SLEEP CENTER | Facility: HOME HEALTH | Age: 62
End: 2022-10-17

## 2022-10-17 ENCOUNTER — OUTPATIENT (OUTPATIENT)
Dept: OUTPATIENT SERVICES | Facility: HOSPITAL | Age: 62
LOS: 1 days | End: 2022-10-17
Payer: COMMERCIAL

## 2022-10-17 PROCEDURE — 95800 SLP STDY UNATTENDED: CPT | Mod: 26

## 2022-10-17 PROCEDURE — 95800 SLP STDY UNATTENDED: CPT

## 2022-10-18 ENCOUNTER — TRANSCRIPTION ENCOUNTER (OUTPATIENT)
Age: 62
End: 2022-10-18

## 2022-10-18 DIAGNOSIS — G47.33 OBSTRUCTIVE SLEEP APNEA (ADULT) (PEDIATRIC): ICD-10-CM

## 2022-10-24 ENCOUNTER — INPATIENT (INPATIENT)
Facility: HOSPITAL | Age: 62
LOS: 0 days | Discharge: HOME | DRG: 274 | End: 2022-10-25
Attending: INTERNAL MEDICINE | Admitting: INTERNAL MEDICINE
Payer: COMMERCIAL

## 2022-10-24 VITALS
DIASTOLIC BLOOD PRESSURE: 70 MMHG | OXYGEN SATURATION: 99 % | RESPIRATION RATE: 16 BRPM | HEART RATE: 55 BPM | SYSTOLIC BLOOD PRESSURE: 120 MMHG

## 2022-10-24 DIAGNOSIS — I48.0 PAROXYSMAL ATRIAL FIBRILLATION: ICD-10-CM

## 2022-10-24 LAB
ANION GAP SERPL CALC-SCNC: 8 MMOL/L — SIGNIFICANT CHANGE UP (ref 5–17)
APTT BLD: 36.1 SEC — HIGH (ref 27.5–35.5)
BLD GP AB SCN SERPL QL: NEGATIVE — SIGNIFICANT CHANGE UP
BUN SERPL-MCNC: 21 MG/DL — SIGNIFICANT CHANGE UP (ref 7–23)
CALCIUM SERPL-MCNC: 9.8 MG/DL — SIGNIFICANT CHANGE UP (ref 8.4–10.5)
CHLORIDE SERPL-SCNC: 109 MMOL/L — HIGH (ref 96–108)
CO2 SERPL-SCNC: 28 MMOL/L — SIGNIFICANT CHANGE UP (ref 22–31)
CREAT SERPL-MCNC: 1.01 MG/DL — SIGNIFICANT CHANGE UP (ref 0.5–1.3)
EGFR: 63 ML/MIN/1.73M2 — SIGNIFICANT CHANGE UP
GLUCOSE SERPL-MCNC: 105 MG/DL — HIGH (ref 70–99)
HCT VFR BLD CALC: 36.8 % — SIGNIFICANT CHANGE UP (ref 34.5–45)
HGB BLD-MCNC: 12.3 G/DL — SIGNIFICANT CHANGE UP (ref 11.5–15.5)
INR BLD: 1.44 — HIGH (ref 0.88–1.16)
ISTAT INR: 1.6 — HIGH (ref 0.88–1.16)
ISTAT PT: 18.4 SEC — HIGH (ref 10–12.9)
ISTAT VENOUS BE: 2 MMOL/L — SIGNIFICANT CHANGE UP (ref -2–3)
ISTAT VENOUS GLUCOSE: 106 MG/DL — HIGH (ref 70–99)
ISTAT VENOUS HCO3: 28 MMOL/L — SIGNIFICANT CHANGE UP (ref 23–28)
ISTAT VENOUS HEMATOCRIT: 36 % — SIGNIFICANT CHANGE UP (ref 34.5–45)
ISTAT VENOUS HEMOGLOBIN: 12.2 GM/DL — SIGNIFICANT CHANGE UP (ref 11.5–15.5)
ISTAT VENOUS IONIZED CALCIUM: 1.28 MMOL/L — SIGNIFICANT CHANGE UP (ref 1.12–1.3)
ISTAT VENOUS PCO2: 47 MMHG — SIGNIFICANT CHANGE UP (ref 41–51)
ISTAT VENOUS PH: 7.38 — SIGNIFICANT CHANGE UP (ref 7.31–7.41)
ISTAT VENOUS PO2: <66 MMHG — LOW (ref 35–40)
ISTAT VENOUS POTASSIUM: 4.5 MMOL/L — SIGNIFICANT CHANGE UP (ref 3.5–5.3)
ISTAT VENOUS SO2: 33 % — SIGNIFICANT CHANGE UP
ISTAT VENOUS SODIUM: 143 MMOL/L — SIGNIFICANT CHANGE UP (ref 135–145)
ISTAT VENOUS TCO2: 29 MMOL/L — SIGNIFICANT CHANGE UP (ref 22–31)
MCHC RBC-ENTMCNC: 29.7 PG — SIGNIFICANT CHANGE UP (ref 27–34)
MCHC RBC-ENTMCNC: 33.4 GM/DL — SIGNIFICANT CHANGE UP (ref 32–36)
MCV RBC AUTO: 88.9 FL — SIGNIFICANT CHANGE UP (ref 80–100)
NRBC # BLD: 0 /100 WBCS — SIGNIFICANT CHANGE UP (ref 0–0)
PLATELET # BLD AUTO: 339 K/UL — SIGNIFICANT CHANGE UP (ref 150–400)
POCT ISTAT CREATININE: 1 MG/DL — SIGNIFICANT CHANGE UP (ref 0.5–1.3)
POTASSIUM SERPL-MCNC: 4.6 MMOL/L — SIGNIFICANT CHANGE UP (ref 3.5–5.3)
POTASSIUM SERPL-SCNC: 4.6 MMOL/L — SIGNIFICANT CHANGE UP (ref 3.5–5.3)
PROTHROM AB SERPL-ACNC: 17.2 SEC — HIGH (ref 10.5–13.4)
RBC # BLD: 4.14 M/UL — SIGNIFICANT CHANGE UP (ref 3.8–5.2)
RBC # FLD: 13.1 % — SIGNIFICANT CHANGE UP (ref 10.3–14.5)
RH IG SCN BLD-IMP: POSITIVE — SIGNIFICANT CHANGE UP
SARS-COV-2 N GENE NPH QL NAA+PROBE: NOT DETECTED
SODIUM SERPL-SCNC: 145 MMOL/L — SIGNIFICANT CHANGE UP (ref 135–145)
WBC # BLD: 5.72 K/UL — SIGNIFICANT CHANGE UP (ref 3.8–10.5)
WBC # FLD AUTO: 5.72 K/UL — SIGNIFICANT CHANGE UP (ref 3.8–10.5)

## 2022-10-24 PROCEDURE — 93656 COMPRE EP EVAL ABLTJ ATR FIB: CPT

## 2022-10-24 RX ORDER — METOPROLOL TARTRATE 50 MG
12.5 TABLET ORAL
Refills: 0 | Status: DISCONTINUED | OUTPATIENT
Start: 2022-10-24 | End: 2022-10-25

## 2022-10-24 RX ORDER — LOSARTAN POTASSIUM 100 MG/1
25 TABLET, FILM COATED ORAL DAILY
Refills: 0 | Status: DISCONTINUED | OUTPATIENT
Start: 2022-10-24 | End: 2022-10-25

## 2022-10-24 RX ORDER — PANTOPRAZOLE SODIUM 20 MG/1
40 TABLET, DELAYED RELEASE ORAL
Refills: 0 | Status: DISCONTINUED | OUTPATIENT
Start: 2022-10-24 | End: 2022-10-25

## 2022-10-24 RX ORDER — LEVOTHYROXINE SODIUM 125 MCG
1 TABLET ORAL
Qty: 0 | Refills: 0 | DISCHARGE

## 2022-10-24 RX ORDER — APIXABAN 2.5 MG/1
5 TABLET, FILM COATED ORAL
Refills: 0 | Status: DISCONTINUED | OUTPATIENT
Start: 2022-10-24 | End: 2022-10-25

## 2022-10-24 RX ORDER — CHOLECALCIFEROL (VITAMIN D3) 125 MCG
1 CAPSULE ORAL
Qty: 0 | Refills: 0 | DISCHARGE

## 2022-10-24 RX ORDER — LEVOTHYROXINE SODIUM 125 MCG
175 TABLET ORAL DAILY
Refills: 0 | Status: DISCONTINUED | OUTPATIENT
Start: 2022-10-24 | End: 2022-10-25

## 2022-10-24 RX ADMIN — Medication 12.5 MILLIGRAM(S): at 20:23

## 2022-10-24 RX ADMIN — LOSARTAN POTASSIUM 25 MILLIGRAM(S): 100 TABLET, FILM COATED ORAL at 21:36

## 2022-10-24 RX ADMIN — APIXABAN 5 MILLIGRAM(S): 2.5 TABLET, FILM COATED ORAL at 20:23

## 2022-10-24 NOTE — H&P ADULT - NSICDXPASTMEDICALHX_GEN_ALL_CORE_FT
PAST MEDICAL HISTORY:  Borderline hypertension     Hypothyroidism     Obesity     Paroxysmal atrial fibrillation

## 2022-10-24 NOTE — CHART NOTE - NSCHARTNOTEFT_GEN_A_CORE
EPS BRIEF OP NOTE    DWAYNE QIU  [id number]    PROCEDURE:  - Cryoablation of atrial fibrillation    INDICATION:  - Paroxysmal AF    ELECTROPHYSIOLOGIST(S):  - Dr. Collado (Attending)  - Dr. Rosado (Fellow)    ANESTHESIOLOGY:  - General  - Local    FINDINGS:  - US guided access of bilateral groins  - 11Fr in RFV, 9Fr in LFV, 7Fr in LFV  - successful isolation of all four pulmonary veins demonstrated by disappearance of pulmonary vein potentials    COMPLICATIONS:  - none    RECOMMENDATIONS:  - 3 hour bedrest  - resume anticoagulation tonight  - continue home meds    Please refer to the full report to follow in [facility] for a detailed description of this case.    --  Armaan Rosado MD  Electrophysiology PGY7

## 2022-10-24 NOTE — PATIENT PROFILE ADULT - FALL HARM RISK - HARM RISK INTERVENTIONS

## 2022-10-24 NOTE — H&P ADULT - HISTORY OF PRESENT ILLNESS
60 y/o F with h/o Hypothyroidism, HTN, Obesity and paroxysmal atrial fibrillation who presents for elective ablation.    First episode of palpitations occurred about three years ago. She has had four other distinct episodes since that time. Admitted to [facility] 2022 with rapid atrial fibrillation and presented to [facility] in July with rapid AFib as well. She states whenever she has an episode she gets jaw pain and palpitations.  She always goes to the ER.  She has never needed a cardioversion . Endorses mild fatigue. No active exertional chest pain, SOB, dizziness, presyncope/syncope.        Labs 2022:   Creat 0.93  TSH 0.07, T4 1.4      Cardiology Summary    EC22 SB @ 46 bpm     echo 2021  Preserved right and left ventricular function  Normal Chamber sizes  There is mild to moderate mitral regurgitation.   No pericardial effusion

## 2022-10-24 NOTE — H&P ADULT - ASSESSMENT
60 y/o F with h/o Hypothyroidism, HTN, Obesity and paroxysmal atrial fibrillation who presents for elective ablation.

## 2022-10-24 NOTE — PRE-ANESTHESIA EVALUATION ADULT - NSANTHPMHFT_GEN_ALL_CORE
63yo F with pAF for Afib ablation under GA.     No prior surgical history or anesthesia history.     PMH:  hypothyroidism, HTN, obesity, pAF    NKDA 61yo F with pAF for Afib ablation under GA.     No prior surgical history or anesthesia history.     First episode of palpitations occurred about three years ago. She has had four other distinct episodes since that time. Admitted to [facility] 2022 with rapid atrial fibrillation and presented to [facility] in July with rapid AFib as well. She states whenever she has an episode she gets jaw pain and palpitations.  She always goes to the ER.  She has never needed a cardioversion . Endorses mild fatigue. No active exertional chest pain, SOB, dizziness, presyncope/syncope.        EC22 SB @ 46 bpm     echo 2021  Preserved right and left ventricular function  Normal Chamber sizes  There is mild to moderate mitral regurgitation.   No pericardial effusion       PMH:  hypothyroidism, HTN, obesity, pAF    NKDA

## 2022-10-25 ENCOUNTER — TRANSCRIPTION ENCOUNTER (OUTPATIENT)
Age: 62
End: 2022-10-25

## 2022-10-25 VITALS — WEIGHT: 169.98 LBS

## 2022-10-25 PROBLEM — I48.0 PAROXYSMAL ATRIAL FIBRILLATION: Chronic | Status: ACTIVE | Noted: 2022-10-24

## 2022-10-25 LAB
HCV AB S/CO SERPL IA: 0.04 S/CO — SIGNIFICANT CHANGE UP
HCV AB SERPL-IMP: SIGNIFICANT CHANGE UP

## 2022-10-25 PROCEDURE — 85027 COMPLETE CBC AUTOMATED: CPT

## 2022-10-25 PROCEDURE — 82330 ASSAY OF CALCIUM: CPT

## 2022-10-25 PROCEDURE — 82947 ASSAY GLUCOSE BLOOD QUANT: CPT

## 2022-10-25 PROCEDURE — 86901 BLOOD TYPING SEROLOGIC RH(D): CPT

## 2022-10-25 PROCEDURE — 84295 ASSAY OF SERUM SODIUM: CPT

## 2022-10-25 PROCEDURE — 85347 COAGULATION TIME ACTIVATED: CPT

## 2022-10-25 PROCEDURE — 86850 RBC ANTIBODY SCREEN: CPT

## 2022-10-25 PROCEDURE — 85730 THROMBOPLASTIN TIME PARTIAL: CPT

## 2022-10-25 PROCEDURE — C1769: CPT

## 2022-10-25 PROCEDURE — 85610 PROTHROMBIN TIME: CPT

## 2022-10-25 PROCEDURE — C1766: CPT

## 2022-10-25 PROCEDURE — 82565 ASSAY OF CREATININE: CPT

## 2022-10-25 PROCEDURE — C1730: CPT

## 2022-10-25 PROCEDURE — 82803 BLOOD GASES ANY COMBINATION: CPT

## 2022-10-25 PROCEDURE — C1759: CPT

## 2022-10-25 PROCEDURE — 86803 HEPATITIS C AB TEST: CPT

## 2022-10-25 PROCEDURE — C1760: CPT

## 2022-10-25 PROCEDURE — 85014 HEMATOCRIT: CPT

## 2022-10-25 PROCEDURE — 80048 BASIC METABOLIC PNL TOTAL CA: CPT

## 2022-10-25 PROCEDURE — C1894: CPT

## 2022-10-25 PROCEDURE — 86900 BLOOD TYPING SEROLOGIC ABO: CPT

## 2022-10-25 PROCEDURE — C1733: CPT

## 2022-10-25 PROCEDURE — 84132 ASSAY OF SERUM POTASSIUM: CPT

## 2022-10-25 PROCEDURE — 36415 COLL VENOUS BLD VENIPUNCTURE: CPT

## 2022-10-25 RX ORDER — PANTOPRAZOLE SODIUM 20 MG/1
1 TABLET, DELAYED RELEASE ORAL
Qty: 30 | Refills: 0
Start: 2022-10-25 | End: 2022-11-23

## 2022-10-25 RX ORDER — ACETAMINOPHEN 500 MG
650 TABLET ORAL ONCE
Refills: 0 | Status: COMPLETED | OUTPATIENT
Start: 2022-10-25 | End: 2022-10-25

## 2022-10-25 RX ADMIN — Medication 175 MICROGRAM(S): at 08:31

## 2022-10-25 RX ADMIN — APIXABAN 5 MILLIGRAM(S): 2.5 TABLET, FILM COATED ORAL at 07:26

## 2022-10-25 RX ADMIN — PANTOPRAZOLE SODIUM 40 MILLIGRAM(S): 20 TABLET, DELAYED RELEASE ORAL at 07:27

## 2022-10-25 RX ADMIN — Medication 650 MILLIGRAM(S): at 04:22

## 2022-10-25 RX ADMIN — Medication 650 MILLIGRAM(S): at 05:30

## 2022-10-25 RX ADMIN — Medication 12.5 MILLIGRAM(S): at 07:26

## 2022-10-25 NOTE — DISCHARGE NOTE PROVIDER - NSDCFUSCHEDAPPT_GEN_ALL_CORE_FT
Akira Villalobos  [facility] Physician [facility]  [facility] [address]  Scheduled Appointment: 11/07/2022     Akira Villalobos  [facility] Physician [facility]  [facility] [address]  Scheduled Appointment: 11/07/2022    Will Collado  [facility] Physician [facility]  [facility] [address]  Scheduled Appointment: 11/22/2022

## 2022-10-25 NOTE — DIETITIAN INITIAL EVALUATION ADULT - OTHER INFO
62 y/o F with h/o Hypothyroidism, HTN, Obesity and paroxysmal atrial fibrillation who presents for elective ablation.    Pt seen per RN request. Pt wanting to lose wt. Reports she has used weight watchers in past with good results. Has not been using for sometime now. Reports highest wt she reached was last summer. Thinks she now is about 226 pounds, which is overall cosistent with admit wt 227 pounds. PTA reports having pizza x2/week, sausage breakfast sandwiches x1-3/week. Likes oatmeal. Thinks she could eat more fruit and vegetable. No lipid profile to review at this time. NKFA. Current diet order for DASH TLC-unsure of %PO intake as entire visit spent obtaining wt hx and recall + providing DASH TLC/weight loss diet education. No pain. Shar 21. No edema, No pressure ulcers. BM+ 10/21.   Please see below for nutritions recommendations.

## 2022-10-25 NOTE — DIETITIAN INITIAL EVALUATION ADULT - ADD RECOMMEND
1. Monitor PO intake/appetite, GI distress, diet tolerance, labs, weights.  2. Honor pt food preferences as able.  3. RD to remain available for additional nutrition interventions as needed.  * Moderate Nutrition Risk.

## 2022-10-25 NOTE — DISCHARGE NOTE PROVIDER - CARE PROVIDER_API CALL
Will Collado)  Cardiac Electrophysiology; Cardiovascular Disease  [address] [place] [address]  Phone: [phone]  Fax: [phone]  Scheduled Appointment: 11/22/2022 01:40 PM

## 2022-10-25 NOTE — DIETITIAN INITIAL EVALUATION ADULT - PERTINENT LABORATORY DATA
10-24    145  |  109<H>  |  21  ----------------------------<  105<H>  4.6   |  28  |  1.01    Ca    9.8      24 Oct 2022 11:56    A1C with Estimated Average Glucose Result: 5.5 % (01-24-22 @ 04:56)

## 2022-10-25 NOTE — DIETITIAN INITIAL EVALUATION ADULT - OTHER CALCULATIONS
6'2'' IBW HAY378 pounds+-10%  wt 227 pounds, BMI 29.2, %NML492  IBW used to calculate energy needs due to pt's current body weight exceeding 120% of IBW   adjust for age and current conditions

## 2022-10-25 NOTE — DISCHARGE NOTE PROVIDER - NSDCCPCAREPLAN_GEN_ALL_CORE_FT
PRINCIPAL DISCHARGE DIAGNOSIS  Diagnosis: Paroxysmal atrial fibrillation  Assessment and Plan of Treatment:        PRINCIPAL DISCHARGE DIAGNOSIS  Diagnosis: Paroxysmal atrial fibrillation  Assessment and Plan of Treatment: You had an ablation of your atrial fibrillation substrate. To do this, catheters were placed in the veins in your groin. For proper healing and to avoid bleeding please avoid strenous activity for the next week. This includes lifting more than 5lbs, straining, squatting and sex. You may shower but do not scrub the area. Avoid tub baths and swimming. After one week you may resume all regular activities.    You are at an increased stroke risk due to this arrhythmia. Additionally, post-ablation, there is increased inflammation in your heart during the healing process as the scar forms. Do not miss any doses of Eliquis. Take this medication twice daily (every 12 hours).    You have been started on a new medication called pantoprazole. This is to prevent stomach acid from entering the esophagus (acid reflux). This should be taken for one month (30 days) in the morning prior to breakfast while the heart heals.    Please follow-up with Dr. Collado at your scheduled appointment.

## 2022-10-25 NOTE — DISCHARGE NOTE NURSING/CASE MANAGEMENT/SOCIAL WORK - NSDCPEFALRISK_GEN_ALL_CORE
For information on Fall & Injury Prevention, visit: [email or web address] OR  [email or web address] OR  https://www.cdc.gov/steadi/patient.html

## 2022-10-25 NOTE — DIETITIAN INITIAL EVALUATION ADULT - COLLABORATION WITH OTHER PROVIDERS
- Provided in and outpatient RD contact info  - Pt would benefit from further Medical Nutrition Therapy after discharge. Recommend to follow up with [facility] for continuity of care. Email [email or web address] or call [phone].

## 2022-10-25 NOTE — DISCHARGE NOTE PROVIDER - NSDCMRMEDTOKEN_GEN_ALL_CORE_FT
Cozaar 25 mg oral tablet: 1 tab(s) orally once a day  Eliquis 5 mg oral tablet: 1 tab(s) orally 2 times a day   levothyroxine 175 mcg (0.175 mg) oral tablet: 1 tab(s) orally once a day  Metoprolol Tartrate 25 mg oral tablet: 0.5 tab(s) orally 2 times a day   pantoprazole 40 mg oral delayed release tablet: 1 tab(s) orally once a day (before a meal)  Vitamin D3 50 mcg (2000 intl units) oral tablet: 1 tab(s) orally once a day   Cozaar 25 mg oral tablet: 1 tab(s) orally once a day  Eliquis 5 mg oral tablet: 1 tab(s) orally 2 times a day   levothyroxine 175 mcg (0.175 mg) oral tablet: 1 tab(s) orally once a day  Metoprolol Tartrate 25 mg oral tablet: 0.5 tab(s) orally 2 times a day   pantoprazole 40 mg oral delayed release tablet: 1 tab(s) orally once a day (before a meal)  pantoprazole 40 mg oral delayed release tablet: 1 tab(s) orally once a day (before a meal)  Vitamin D3 50 mcg (2000 intl units) oral tablet: 1 tab(s) orally once a day

## 2022-10-25 NOTE — DIETITIAN INITIAL EVALUATION ADULT - PERTINENT MEDS FT
MEDICATIONS  (STANDING):  apixaban 5 milliGRAM(s) Oral two times a day  levothyroxine 175 MICROGram(s) Oral daily  losartan 25 milliGRAM(s) Oral daily  metoprolol tartrate 12.5 milliGRAM(s) Oral two times a day  pantoprazole    Tablet 40 milliGRAM(s) Oral before breakfast    MEDICATIONS  (PRN):

## 2022-10-25 NOTE — DISCHARGE NOTE PROVIDER - HOSPITAL COURSE
Ms. Man is a 60 y/o F with h/o Hypothyroidism, HTN, Obesity and paroxysmal atrial fibrillation who presented for elective ablation. The procedure was successful and uncomplicated. Venous groin access sites are healing well without bleeding or hematoma. She feels well post-procedure, denies c/p, sob, lightheadedness, palpitations, LE edema and syncope. Tele overnight has been sinus.     The patient's first episode of palpitations occurred about three years ago. She has had four other distinct episodes since that time. Admitted to [facility] 1/2022 with rapid atrial fibrillation and presented to [facility] in July with rapid AFib as well. She states whenever she has an episode she gets jaw pain and palpitations.  She always goes to the ER.  She has never needed a cardioversion. Endorses mild fatigue. No active exertional chest pain, SOB, dizziness, presyncope/syncope.

## 2022-10-25 NOTE — DISCHARGE NOTE NURSING/CASE MANAGEMENT/SOCIAL WORK - PATIENT PORTAL LINK FT
You can access the FollowMyHealth Patient Portal offered by [facility] by registering at the following website: [email or web address]. By joining [employer]’s FollowMyHealth portal, you will also be able to view your health information using other applications (apps) compatible with our system.

## 2022-10-26 ENCOUNTER — TRANSCRIPTION ENCOUNTER (OUTPATIENT)
Age: 62
End: 2022-10-26

## 2022-11-01 DIAGNOSIS — I48.0 PAROXYSMAL ATRIAL FIBRILLATION: ICD-10-CM

## 2022-11-01 DIAGNOSIS — I10 ESSENTIAL (PRIMARY) HYPERTENSION: ICD-10-CM

## 2022-11-01 DIAGNOSIS — I34.0 NONRHEUMATIC MITRAL (VALVE) INSUFFICIENCY: ICD-10-CM

## 2022-11-01 DIAGNOSIS — E66.9 OBESITY, UNSPECIFIED: ICD-10-CM

## 2022-11-01 DIAGNOSIS — E03.9 HYPOTHYROIDISM, UNSPECIFIED: ICD-10-CM

## 2022-11-01 DIAGNOSIS — Z79.01 LONG TERM (CURRENT) USE OF ANTICOAGULANTS: ICD-10-CM

## 2022-11-01 LAB
ISTAT ACTK (ACTIVATED CLOTTING TIME KAOLIN): 301 SEC — HIGH (ref 74–137)
ISTAT ACTK (ACTIVATED CLOTTING TIME KAOLIN): 318 SEC — HIGH (ref 74–137)
ISTAT ACTK (ACTIVATED CLOTTING TIME KAOLIN): 318 SEC — HIGH (ref 74–137)
ISTAT ACTK (ACTIVATED CLOTTING TIME KAOLIN): 353 SEC — HIGH (ref 74–137)

## 2022-11-07 ENCOUNTER — APPOINTMENT (OUTPATIENT)
Dept: HEART AND VASCULAR | Facility: CLINIC | Age: 62
End: 2022-11-07

## 2022-11-07 VITALS
HEART RATE: 55 BPM | DIASTOLIC BLOOD PRESSURE: 80 MMHG | BODY MASS INDEX: 30.88 KG/M2 | WEIGHT: 228 LBS | TEMPERATURE: 98.2 F | SYSTOLIC BLOOD PRESSURE: 112 MMHG | HEIGHT: 72 IN | OXYGEN SATURATION: 97 %

## 2022-11-07 PROCEDURE — 99213 OFFICE O/P EST LOW 20 MIN: CPT

## 2022-11-07 NOTE — DISCUSSION/SUMMARY
[FreeTextEntry1] : 1) Paroxysmal atrial fibrillation: s/p Afib ablation 10/24/22, advised to continue Toprol XL 25 qd and Eliquis for now. EP recommendations appreciated. \par 2) Hypertension: controlled, advised to continue Losartan 25 qd. \par 3) Hypothyroid: TSH 0.7, coud be causing bradycardia and prolonged QT, advised to decrease Levothyroxine 175 -> 150 qd. Recheck TSH next visit. \par

## 2022-11-07 NOTE — HISTORY OF PRESENT ILLNESS
[FreeTextEntry1] : 62yo F,  with history of hypertension, hypothyroidism and paroxysmal atrial fibrillation on Eliquis returns to clinic s/p Afib ablation 10/22. As previously mentioned episodes of Afib are infrequent, but bothersome when they occur. She is presently not taking Propranolol given baseline HR 45-50 bpm. She is scheduled for Afib ablation in the coming weeks. Review of current labs are notable for TSH 0.07 on Levothyroxine 175 qd. \par \par SHx: denies smoking or alcohol. Employed in IT. Lives with independently.\par Allergies: Eggplant, Hazelnut. \par Meds: Metoprolol 12.5 prn, Levothyroxine 175 qd, Eliquis 5 qd, Propranolol 10 prn, Vit D3 supplements. \par \par Labs: \par 7/22: Na 141, K 5.0, BUN/Cre 17/0.9, Ca 9.8, ALT 14, TSH 0.07, fT4 1.5. \par \par Studies: \par 9/26/22 EKG: sinus, rate 45, normal axis, no q waves, incomplete RBBB, prolonged cQT interval 460ms.

## 2022-11-22 ENCOUNTER — APPOINTMENT (OUTPATIENT)
Dept: HEART AND VASCULAR | Facility: CLINIC | Age: 62
End: 2022-11-22

## 2022-11-22 ENCOUNTER — NON-APPOINTMENT (OUTPATIENT)
Age: 62
End: 2022-11-22

## 2022-11-22 VITALS
SYSTOLIC BLOOD PRESSURE: 117 MMHG | DIASTOLIC BLOOD PRESSURE: 58 MMHG | HEART RATE: 46 BPM | WEIGHT: 227.5 LBS | BODY MASS INDEX: 30.81 KG/M2 | HEIGHT: 72 IN

## 2022-11-22 PROCEDURE — 93000 ELECTROCARDIOGRAM COMPLETE: CPT

## 2022-11-22 PROCEDURE — 99213 OFFICE O/P EST LOW 20 MIN: CPT | Mod: 25

## 2022-11-22 RX ORDER — LEVOTHYROXINE SODIUM 0.17 MG/1
175 TABLET ORAL
Qty: 90 | Refills: 0 | Status: DISCONTINUED | COMMUNITY
Start: 2022-10-31

## 2022-11-22 RX ORDER — PANTOPRAZOLE 40 MG/1
40 TABLET, DELAYED RELEASE ORAL
Qty: 30 | Refills: 0 | Status: DISCONTINUED | COMMUNITY
Start: 2022-10-25

## 2022-11-22 NOTE — HISTORY OF PRESENT ILLNESS
[FreeTextEntry1] : 62 y.o. female with h/o PAF s/p CRYO 10/22.  She is aware of brief palpitation but no sustained arrhythmia.  No recurrent AFib of which she is aware.  She has no groin complaints and is tolerating her anticoagulation without difficulty. No bleeding issues on Eliquis.\par

## 2023-01-25 ENCOUNTER — APPOINTMENT (OUTPATIENT)
Dept: HEART AND VASCULAR | Facility: CLINIC | Age: 63
End: 2023-01-25
Payer: COMMERCIAL

## 2023-01-25 VITALS
HEIGHT: 72 IN | OXYGEN SATURATION: 98 % | WEIGHT: 226 LBS | DIASTOLIC BLOOD PRESSURE: 66 MMHG | HEART RATE: 57 BPM | BODY MASS INDEX: 30.61 KG/M2 | SYSTOLIC BLOOD PRESSURE: 106 MMHG

## 2023-01-25 VITALS — SYSTOLIC BLOOD PRESSURE: 112 MMHG | DIASTOLIC BLOOD PRESSURE: 75 MMHG

## 2023-01-25 PROCEDURE — 99213 OFFICE O/P EST LOW 20 MIN: CPT

## 2023-01-25 NOTE — HISTORY OF PRESENT ILLNESS
[FreeTextEntry1] : 62 F hypothyroidism\par \par Had episode of palpitations Dec 2020 that lasted 4 hours, noted on her pulse ox that her hr was jumping up and down.  Saw a cardiologist in January, did a 48 hour holter no afib or any sustained arrhythmias.  No echo or stress testing done.  Feels generalized fatigue, notes this has gotten worse over the last 3 months.  Feels very stressed due to her job and taking care of her brother who has mental illness. Notes chronic chest tightness, thinks it is due to stress.  Notes occasional brief palpitations.  No sob.  Can walk > 2 flights of stairs, no limitations, though says she gets tired and needs to rest after 3 flights.  \par \par Fhx: Mother CHF, Father CAD age 60\par  \par  \par  2/9/22       In [facility] Jan 2022 dx with PAF. also dx with PSVT on [place].  She currently does not exercise but likes to swim and bike in Summer.  Just given Amio by PCP wc she has not started.\par \par 6/21/22 In NSR\par \par 1/25/23  Had A fib July and Aug 2022.  S/P Ablation Oct 2022. Maintaining NSR.  She reports a low TSH

## 2023-01-25 NOTE — ASSESSMENT
[FreeTextEntry1] :  PAF- On Eliquis and BB, I discourage Amio that was given to her elsewhere.  Ablation discussed for both arrhythmias. 2 bouts of A Fib, s/p ablation Oct 2022. Currently doing well on BB and Eliquis.\par \par PSVT- Seen on [facility] Monitor\par \par Mild to Moderate MR- Seen on Echo Sept 2021.  BP controlled.\par \par Health Maintainence- colonoscopy Oct 2021 with Dr Lopez.\par

## 2023-02-21 ENCOUNTER — TRANSCRIPTION ENCOUNTER (OUTPATIENT)
Age: 63
End: 2023-02-21

## 2023-03-13 ENCOUNTER — RX RENEWAL (OUTPATIENT)
Age: 63
End: 2023-03-13

## 2023-03-16 ENCOUNTER — TRANSCRIPTION ENCOUNTER (OUTPATIENT)
Age: 63
End: 2023-03-16

## 2023-03-22 ENCOUNTER — NON-APPOINTMENT (OUTPATIENT)
Age: 63
End: 2023-03-22

## 2023-03-22 ENCOUNTER — APPOINTMENT (OUTPATIENT)
Dept: HEART AND VASCULAR | Facility: CLINIC | Age: 63
End: 2023-03-22
Payer: COMMERCIAL

## 2023-03-22 VITALS
DIASTOLIC BLOOD PRESSURE: 64 MMHG | HEART RATE: 52 BPM | WEIGHT: 224.04 LBS | HEIGHT: 72 IN | BODY MASS INDEX: 30.34 KG/M2 | OXYGEN SATURATION: 97 % | SYSTOLIC BLOOD PRESSURE: 110 MMHG

## 2023-03-22 DIAGNOSIS — Z80.3 FAMILY HISTORY OF MALIGNANT NEOPLASM OF BREAST: ICD-10-CM

## 2023-03-22 DIAGNOSIS — Z01.810 ENCOUNTER FOR PREPROCEDURAL CARDIOVASCULAR EXAMINATION: ICD-10-CM

## 2023-03-22 PROCEDURE — 93000 ELECTROCARDIOGRAM COMPLETE: CPT

## 2023-03-22 PROCEDURE — 99214 OFFICE O/P EST MOD 30 MIN: CPT | Mod: 25

## 2023-03-22 NOTE — HISTORY OF PRESENT ILLNESS
[FreeTextEntry1] : 62 F hypothyroidism\par \par Had episode of palpitations Dec 2020 that lasted 4 hours, noted on her pulse ox that her hr was jumping up and down.  Saw a cardiologist in January, did a 48 hour holter no afib or any sustained arrhythmias.  No echo or stress testing done.  Feels generalized fatigue, notes this has gotten worse over the last 3 months.  Feels very stressed due to her job and taking care of her brother who has mental illness. Notes chronic chest tightness, thinks it is due to stress.  Notes occasional brief palpitations.  No sob.  Can walk > 2 flights of stairs, no limitations, though says she gets tired and needs to rest after 3 flights.  \par \par Fhx: Mother CHF, Father CAD age 60\par  \par  \par  2/9/22       In [facility] Jan 2022 dx with PAF. also dx with PSVT on [place].  She currently does not exercise but likes to swim and bike in Summer.  Just given Amio by PCP wc she has not started.\par \par 6/21/22 In NSR\par \par 1/25/23  Had A fib July and Aug 2022.  S/P Ablation Oct 2022. Maintaining NSR.  She reports a low TSH\par \par 3/22/23  For a right lumpectomy [place] [facility] [place]11[place]23 with Dr Thierry Fritz

## 2023-03-22 NOTE — ASSESSMENT
[FreeTextEntry1] : PreOp- Cleared for breast surgery 4/11/23 @ [facility] with Dr Fritz.  Will hold Eliquis for 7 doses,3 days before plus the morning of the surgery. \par \par  PAF- On Eliquis and BB, I discourage Amio that was given to her elsewhere.  Ablation discussed for both arrhythmias. 2 bouts of A Fib, s/p ablation Oct 2022. Currently doing well on BB and Eliquis.\par \par PSVT- Seen on [facility] Monitor\par \par Mild to Moderate MR- Seen on Echo Sept 2021.  BP controlled.\par \par Health Maintainence- colonoscopy Oct 2021 with Dr Lopez.\par

## 2023-04-28 ENCOUNTER — NON-APPOINTMENT (OUTPATIENT)
Age: 63
End: 2023-04-28

## 2023-04-28 ENCOUNTER — APPOINTMENT (OUTPATIENT)
Dept: HEART AND VASCULAR | Facility: CLINIC | Age: 63
End: 2023-04-28
Payer: COMMERCIAL

## 2023-04-28 VITALS
WEIGHT: 224.25 LBS | HEIGHT: 72 IN | HEART RATE: 56 BPM | BODY MASS INDEX: 30.37 KG/M2 | DIASTOLIC BLOOD PRESSURE: 52 MMHG | SYSTOLIC BLOOD PRESSURE: 91 MMHG

## 2023-04-28 PROCEDURE — 93000 ELECTROCARDIOGRAM COMPLETE: CPT

## 2023-04-28 PROCEDURE — 99213 OFFICE O/P EST LOW 20 MIN: CPT | Mod: 25

## 2023-04-28 NOTE — HISTORY OF PRESENT ILLNESS
[FreeTextEntry1] : 62 y.o. female with h/o small invasive duct R breast CA s/p lumpectomy/lymph node biopsy 4/11/2023, PAF s/p CRYO 10/24/22 who presents for follow-up.  \par \par She is doing very well from an arrhythmia perspective. She has had no recurrent AFib of which she is aware. She has been taking lopressor 12.5mg daily in the morning. She has had no bleeding issues on Eliquis. \par

## 2023-04-28 NOTE — END OF VISIT
[FreeTextEntry3] : I, Ewa Marquze, am scribing for (in the presence of) Dr. Collado the following sections: HPI, PMH,Family/social history, ROS, Physical Exam, Assessment / Plan. \par \par I, Will Collado, personally performed the services described in the documentation, reviewed the documentation recorded by the scribe in my presence and it accurately and completely records my words and actions.

## 2023-05-07 ENCOUNTER — TRANSCRIPTION ENCOUNTER (OUTPATIENT)
Age: 63
End: 2023-05-07

## 2023-05-07 ENCOUNTER — INPATIENT (INPATIENT)
Facility: HOSPITAL | Age: 63
LOS: 0 days | Discharge: HOME | DRG: 310 | End: 2023-05-07
Attending: INTERNAL MEDICINE | Admitting: INTERNAL MEDICINE
Payer: COMMERCIAL

## 2023-05-07 VITALS
WEIGHT: 225.09 LBS | TEMPERATURE: 98 F | SYSTOLIC BLOOD PRESSURE: 140 MMHG | DIASTOLIC BLOOD PRESSURE: 86 MMHG | HEART RATE: 120 BPM | HEIGHT: 72 IN | RESPIRATION RATE: 18 BRPM | OXYGEN SATURATION: 95 %

## 2023-05-07 VITALS — TEMPERATURE: 97 F

## 2023-05-07 DIAGNOSIS — C50.919 MALIGNANT NEOPLASM OF UNSPECIFIED SITE OF UNSPECIFIED FEMALE BREAST: ICD-10-CM

## 2023-05-07 DIAGNOSIS — I48.0 PAROXYSMAL ATRIAL FIBRILLATION: ICD-10-CM

## 2023-05-07 DIAGNOSIS — R03.0 ELEVATED BLOOD-PRESSURE READING, WITHOUT DIAGNOSIS OF HYPERTENSION: ICD-10-CM

## 2023-05-07 DIAGNOSIS — E03.9 HYPOTHYROIDISM, UNSPECIFIED: ICD-10-CM

## 2023-05-07 LAB
ALBUMIN SERPL ELPH-MCNC: 4.1 G/DL — SIGNIFICANT CHANGE UP (ref 3.3–5)
ALP SERPL-CCNC: 97 U/L — SIGNIFICANT CHANGE UP (ref 40–120)
ALT FLD-CCNC: 20 U/L — SIGNIFICANT CHANGE UP (ref 10–45)
ANION GAP SERPL CALC-SCNC: 12 MMOL/L — SIGNIFICANT CHANGE UP (ref 5–17)
APTT BLD: 33.9 SEC — SIGNIFICANT CHANGE UP (ref 27.5–35.5)
AST SERPL-CCNC: 22 U/L — SIGNIFICANT CHANGE UP (ref 10–40)
BASOPHILS # BLD AUTO: 0.04 K/UL — SIGNIFICANT CHANGE UP (ref 0–0.2)
BASOPHILS NFR BLD AUTO: 0.5 % — SIGNIFICANT CHANGE UP (ref 0–2)
BILIRUB SERPL-MCNC: 0.2 MG/DL — SIGNIFICANT CHANGE UP (ref 0.2–1.2)
BUN SERPL-MCNC: 24 MG/DL — HIGH (ref 7–23)
CALCIUM SERPL-MCNC: 9.7 MG/DL — SIGNIFICANT CHANGE UP (ref 8.4–10.5)
CHLORIDE SERPL-SCNC: 104 MMOL/L — SIGNIFICANT CHANGE UP (ref 96–108)
CO2 SERPL-SCNC: 22 MMOL/L — SIGNIFICANT CHANGE UP (ref 22–31)
CREAT SERPL-MCNC: 1.07 MG/DL — SIGNIFICANT CHANGE UP (ref 0.5–1.3)
EGFR: 58 ML/MIN/1.73M2 — LOW
EOSINOPHIL # BLD AUTO: 0.2 K/UL — SIGNIFICANT CHANGE UP (ref 0–0.5)
EOSINOPHIL NFR BLD AUTO: 2.3 % — SIGNIFICANT CHANGE UP (ref 0–6)
GLUCOSE SERPL-MCNC: 139 MG/DL — HIGH (ref 70–99)
HCT VFR BLD CALC: 38.2 % — SIGNIFICANT CHANGE UP (ref 34.5–45)
HGB BLD-MCNC: 13.1 G/DL — SIGNIFICANT CHANGE UP (ref 11.5–15.5)
IMM GRANULOCYTES NFR BLD AUTO: 0.1 % — SIGNIFICANT CHANGE UP (ref 0–0.9)
INR BLD: 1.08 — SIGNIFICANT CHANGE UP (ref 0.88–1.16)
LYMPHOCYTES # BLD AUTO: 3.37 K/UL — HIGH (ref 1–3.3)
LYMPHOCYTES # BLD AUTO: 38.3 % — SIGNIFICANT CHANGE UP (ref 13–44)
MCHC RBC-ENTMCNC: 30.2 PG — SIGNIFICANT CHANGE UP (ref 27–34)
MCHC RBC-ENTMCNC: 34.3 GM/DL — SIGNIFICANT CHANGE UP (ref 32–36)
MCV RBC AUTO: 88 FL — SIGNIFICANT CHANGE UP (ref 80–100)
MONOCYTES # BLD AUTO: 0.67 K/UL — SIGNIFICANT CHANGE UP (ref 0–0.9)
MONOCYTES NFR BLD AUTO: 7.6 % — SIGNIFICANT CHANGE UP (ref 2–14)
NEUTROPHILS # BLD AUTO: 4.52 K/UL — SIGNIFICANT CHANGE UP (ref 1.8–7.4)
NEUTROPHILS NFR BLD AUTO: 51.2 % — SIGNIFICANT CHANGE UP (ref 43–77)
NRBC # BLD: 0 /100 WBCS — SIGNIFICANT CHANGE UP (ref 0–0)
NT-PROBNP SERPL-SCNC: 145 PG/ML — SIGNIFICANT CHANGE UP (ref 0–300)
PLATELET # BLD AUTO: 440 K/UL — HIGH (ref 150–400)
POTASSIUM SERPL-MCNC: 4 MMOL/L — SIGNIFICANT CHANGE UP (ref 3.5–5.3)
POTASSIUM SERPL-SCNC: 4 MMOL/L — SIGNIFICANT CHANGE UP (ref 3.5–5.3)
PROT SERPL-MCNC: 7.5 G/DL — SIGNIFICANT CHANGE UP (ref 6–8.3)
PROTHROM AB SERPL-ACNC: 12.9 SEC — SIGNIFICANT CHANGE UP (ref 10.5–13.4)
RBC # BLD: 4.34 M/UL — SIGNIFICANT CHANGE UP (ref 3.8–5.2)
RBC # FLD: 12.9 % — SIGNIFICANT CHANGE UP (ref 10.3–14.5)
SODIUM SERPL-SCNC: 138 MMOL/L — SIGNIFICANT CHANGE UP (ref 135–145)
T3 SERPL-MCNC: 134 NG/DL — SIGNIFICANT CHANGE UP (ref 80–200)
T4 AB SER-ACNC: 10.91 UG/DL — SIGNIFICANT CHANGE UP (ref 4.5–11.7)
TROPONIN T SERPL-MCNC: 0.01 NG/ML — SIGNIFICANT CHANGE UP (ref 0–0.01)
TROPONIN T SERPL-MCNC: <0.01 NG/ML — SIGNIFICANT CHANGE UP (ref 0–0.01)
TSH SERPL-MCNC: 0.15 UIU/ML — LOW (ref 0.27–4.2)
WBC # BLD: 8.81 K/UL — SIGNIFICANT CHANGE UP (ref 3.8–10.5)
WBC # FLD AUTO: 8.81 K/UL — SIGNIFICANT CHANGE UP (ref 3.8–10.5)

## 2023-05-07 PROCEDURE — 96376 TX/PRO/DX INJ SAME DRUG ADON: CPT

## 2023-05-07 PROCEDURE — 99239 HOSP IP/OBS DSCHRG MGMT >30: CPT

## 2023-05-07 PROCEDURE — 83880 ASSAY OF NATRIURETIC PEPTIDE: CPT

## 2023-05-07 PROCEDURE — 84436 ASSAY OF TOTAL THYROXINE: CPT

## 2023-05-07 PROCEDURE — 85730 THROMBOPLASTIN TIME PARTIAL: CPT

## 2023-05-07 PROCEDURE — 36415 COLL VENOUS BLD VENIPUNCTURE: CPT

## 2023-05-07 PROCEDURE — 71045 X-RAY EXAM CHEST 1 VIEW: CPT

## 2023-05-07 PROCEDURE — 99291 CRITICAL CARE FIRST HOUR: CPT

## 2023-05-07 PROCEDURE — 71045 X-RAY EXAM CHEST 1 VIEW: CPT | Mod: 26

## 2023-05-07 PROCEDURE — 80053 COMPREHEN METABOLIC PANEL: CPT

## 2023-05-07 PROCEDURE — 84443 ASSAY THYROID STIM HORMONE: CPT

## 2023-05-07 PROCEDURE — 85025 COMPLETE CBC W/AUTO DIFF WBC: CPT

## 2023-05-07 PROCEDURE — 84480 ASSAY TRIIODOTHYRONINE (T3): CPT

## 2023-05-07 PROCEDURE — 84484 ASSAY OF TROPONIN QUANT: CPT

## 2023-05-07 PROCEDURE — 84479 ASSAY OF THYROID (T3 OR T4): CPT

## 2023-05-07 PROCEDURE — 96374 THER/PROPH/DIAG INJ IV PUSH: CPT

## 2023-05-07 PROCEDURE — 85610 PROTHROMBIN TIME: CPT

## 2023-05-07 RX ORDER — LOSARTAN POTASSIUM 100 MG/1
25 TABLET, FILM COATED ORAL DAILY
Refills: 0 | Status: DISCONTINUED | OUTPATIENT
Start: 2023-05-07 | End: 2023-05-07

## 2023-05-07 RX ORDER — ACETAMINOPHEN 500 MG
650 TABLET ORAL ONCE
Refills: 0 | Status: COMPLETED | OUTPATIENT
Start: 2023-05-07 | End: 2023-05-07

## 2023-05-07 RX ORDER — METOPROLOL TARTRATE 50 MG
0.5 TABLET ORAL
Qty: 15 | Refills: 3
Start: 2023-05-07 | End: 2023-09-03

## 2023-05-07 RX ORDER — METOPROLOL TARTRATE 50 MG
5 TABLET ORAL ONCE
Refills: 0 | Status: COMPLETED | OUTPATIENT
Start: 2023-05-07 | End: 2023-05-07

## 2023-05-07 RX ORDER — APIXABAN 2.5 MG/1
5 TABLET, FILM COATED ORAL EVERY 12 HOURS
Refills: 0 | Status: DISCONTINUED | OUTPATIENT
Start: 2023-05-07 | End: 2023-05-07

## 2023-05-07 RX ORDER — METOPROLOL TARTRATE 50 MG
0.5 TABLET ORAL
Qty: 30 | Refills: 3
Start: 2023-05-07 | End: 2023-09-03

## 2023-05-07 RX ORDER — LEVOTHYROXINE SODIUM 125 MCG
175 TABLET ORAL DAILY
Refills: 0 | Status: DISCONTINUED | OUTPATIENT
Start: 2023-05-07 | End: 2023-05-07

## 2023-05-07 RX ORDER — METOPROLOL TARTRATE 50 MG
12.5 TABLET ORAL
Refills: 0 | Status: DISCONTINUED | OUTPATIENT
Start: 2023-05-07 | End: 2023-05-07

## 2023-05-07 RX ORDER — METOPROLOL TARTRATE 50 MG
25 TABLET ORAL ONCE
Refills: 0 | Status: COMPLETED | OUTPATIENT
Start: 2023-05-07 | End: 2023-05-07

## 2023-05-07 RX ORDER — SODIUM CHLORIDE 9 MG/ML
1000 INJECTION INTRAMUSCULAR; INTRAVENOUS; SUBCUTANEOUS ONCE
Refills: 0 | Status: COMPLETED | OUTPATIENT
Start: 2023-05-07 | End: 2023-05-07

## 2023-05-07 RX ADMIN — Medication 5 MILLIGRAM(S): at 01:50

## 2023-05-07 RX ADMIN — Medication 650 MILLIGRAM(S): at 02:53

## 2023-05-07 RX ADMIN — Medication 175 MICROGRAM(S): at 07:42

## 2023-05-07 RX ADMIN — Medication 650 MILLIGRAM(S): at 07:46

## 2023-05-07 RX ADMIN — Medication 12.5 MILLIGRAM(S): at 06:47

## 2023-05-07 RX ADMIN — Medication 650 MILLIGRAM(S): at 14:35

## 2023-05-07 RX ADMIN — Medication 5 MILLIGRAM(S): at 02:23

## 2023-05-07 RX ADMIN — Medication 25 MILLIGRAM(S): at 01:51

## 2023-05-07 RX ADMIN — Medication 650 MILLIGRAM(S): at 15:30

## 2023-05-07 RX ADMIN — APIXABAN 5 MILLIGRAM(S): 2.5 TABLET, FILM COATED ORAL at 06:46

## 2023-05-07 RX ADMIN — Medication 5 MILLIGRAM(S): at 03:38

## 2023-05-07 RX ADMIN — Medication 650 MILLIGRAM(S): at 06:46

## 2023-05-07 RX ADMIN — SODIUM CHLORIDE 1000 MILLILITER(S): 9 INJECTION INTRAMUSCULAR; INTRAVENOUS; SUBCUTANEOUS at 02:24

## 2023-05-07 RX ADMIN — Medication 650 MILLIGRAM(S): at 02:23

## 2023-05-07 NOTE — ED ADULT NURSE NOTE - NSFALLRSKASSESSTYPE_ED_ALL_ED
----- Message from Chet Batista sent at 9/26/2017  3:22 PM CDT -----  Contact: Audrey Hannah  [phone] Ext [phone]  Calling to check on the status of the faxed I send over to your office for the above pt on 09/19/2017, advice    Thanks   
LVM forms re faxed   
Initial (On Arrival)

## 2023-05-07 NOTE — H&P ADULT - HISTORY OF PRESENT ILLNESS
Patient is a 62 year old female with PMHx of recently Dx Breast CA S/P lumpectomy-planned to start RT, hypothyroidism , HTN, PAF on Eliquis who is s/p PVI in October 2022 and has been doing well with no recurrence of AF. Last week her BB w as started and last evening she went back into AF. Patient reports significant palpitations. She took metoprolol 25 x2 at home with no sucess so she decided to come to the ED. In ED 12 Lead EKG showed AF with RVR. Patient denies chest pain, palpitations dizziness, LE edema or syncope. Patient received Metoprolol 25mg PO as well as lopressor 5mg IVP x3 which improved her rate to . Her symptoms improved and she was admitted to cardiac telemetry for further managment  Patient is a 62 year old female with PMHx of recently Dx Breast CA S/P lumpectomy-planned to start RT, hypothyroidism , HTN, PAF on Eliquis who is s/p PVI in October 2022 and has been doing well with no recurrence of AF. Last week her BB w as started and last evening she went back into AF. Patient reports significant palpitations. She took metoprolol 25 x2 at home with no sucess so she decided to come to the ED. In ED 12 Lead EKG showed AF with RVR. Patient denies chest pain, palpitations dizziness, LE edema or syncope. Patient received Metoprolol 25mg PO as well as lopressor 5mg IVP x3 which improved her rate to . In ED troponin was negative and TSH was low. Her symptoms improved and she was admitted to cardiac telemetry for further management

## 2023-05-07 NOTE — ED PROVIDER NOTE - PROGRESS NOTE DETAILS
[place]: CXR wnl. Plt 440, BUN 24, other labs grossly wnl. Symptoms improving. HR ~105-110, will give additional meds, reassess. Updated pt. [place]: Symptoms improving as HR is improving but pt still w/ -110, will gvei 3rd 5mg IV lopressor, admit for further management. Updated pt. [place]: TSH 0.153, will add on other thyroid studies. Symptoms improving as HR is improving but pt still w/ -110, will give 3rd 5mg IV lopressor, admit for further management. Updated pt.

## 2023-05-07 NOTE — ED ADULT NURSE NOTE - OBJECTIVE STATEMENT
Pt presents to ER c/o sudden onset of chest pain, palpitations, and SOB, Pt A&O x3, calm and cooperative, airway patent, respirations regular, non-labored, lungs clear bilaterally to auscultation, abdomen soft non-tender, normoactive bowel sounds noted in all quadrants, strong palpable peripheral pulses noted, skin warm dry intact. Pt waiting ER provider evaluation.

## 2023-05-07 NOTE — DISCHARGE NOTE PROVIDER - PROVIDER TOKENS
PROVIDER:[TOKEN:[[id number]],FOLLOWUP:[2 weeks],ESTABLISHEDPATIENT:[T]],PROVIDER:[TOKEN:[[id number]],FOLLOWUP:[2 weeks],ESTABLISHEDPATIENT:[T]]

## 2023-05-07 NOTE — PATIENT PROFILE ADULT - DOES PATIENT HAVE ADVANCE DIRECTIVE
Scheduled procedure with patient,  at Other: Dr Dockery  & Nursing staff  Scheduled Via: Case Creation for [facility] Case # [id number] Procedure date: 11/22/22 Procedure time: TBD Rep Contacted: n/a Entered into MD's [place] n/a Insurance confirmed as [employer] , will be the same at time of procedure: Yes per   Pre-Op testing required Yes, Patient informed Yes Prep required n/a, as advised by nursing with Dr Dockery pre instructions given at office visit DURAN: System generated  
No

## 2023-05-07 NOTE — DISCHARGE NOTE PROVIDER - HOSPITAL COURSE
Patient is a 62 year old female with PMHx of recently Dx Breast CA S/P lumpectomy-planned to start RT, hypothyroidism , HTN, PAF on Eliquis who is s/p PVI in October 2022 and has been doing well with no recurrence of AF. Patient reports her metoprolol was d/c 1 week ago and last evening she went back into AF. Patient reports significant palpitations. She took metoprolol 25 x2 at home with no success so she decided to come to the ED. In ED 12 Lead EKG showed AF with RVR. Patient denies chest pain, palpitations dizziness, LE edema or syncope. Patient received Metoprolol 25mg PO as well as lopressor 5mg IVP x3 which improved her rate to . In ED troponin was negative and TSH was low (normal T4/T3 however). Her symptoms improved and she was admitted to cardiac telemetry for further management     Patient reports last week her metoprolol was stopped as she was feeling better. She has a 10-week heart monitor patch on currently for her EP MD, Dr. Collado, and is due to come off next week. Reports her baseline HR tends to run in 55 bpm.     Patient was initially planned for DCCV this admission, however, she self-converted to asymptomatic sinus bradycardia w/ HR range in 45-55 bpm. At this time, no further cardiac procedure is being recommended otherwise. She will be started on Metoprolol Tartrate 12.5mg bid. She will otherwise continue her home regiment Eliquis 5mg bid, Losartan 25mg qd, and Levothyroxine 175mcg qd.      Pt seen and examined at bedside this AM without any complaints or events overnight, VSS, labs and telemetry reviewed and pt stable for discharge as discussed with Dr. Alston. Pt has received appropriate discharge instructions, including medication regimen, access site management and follow up with Dr. Collado & and Dr. Miranda in 1-2 weeks.       Patient is a 62 year old female with PMHx of recently Dx Breast CA S/P lumpectomy-planned to start RT, hypothyroidism , HTN, PAF on Eliquis who is s/p PVI in October 2022 and has been doing well with no recurrence of AF. Patient reports her metoprolol was d/c 1 week ago and last evening she went back into AF. Patient reports significant palpitations. She took metoprolol 25 x2 at home with no success so she decided to come to the ED. In ED 12 Lead EKG showed AF with RVR. Patient denies chest pain, palpitations dizziness, LE edema or syncope. Patient received Metoprolol 25mg PO as well as lopressor 5mg IVP x3 which improved her rate to . In ED troponin was negative and TSH was low (normal T4/T3 however). Her symptoms improved and she was admitted to cardiac telemetry for further management     Patient reports last week her metoprolol was stopped as she was feeling better. She has a 10-week heart monitor patch on currently for her EP MD, Dr. Collado, and is due to come off next week. Reports her baseline HR tends to run in 55 bpm. In Hospital, her HR range between mid 40's to mid 50's.     Patient was initially planned for DCCV this admission, however, she self-converted to asymptomatic sinus bradycardia w/ HR range in 45-55 bpm. At this time, no further cardiac procedure is being recommended otherwise. She will be started on Metoprolol Tartrate 12.5mg qd per MD discussion. She will otherwise continue her home regiment Eliquis 5mg bid, Losartan 25mg qd, and Levothyroxine 175mcg qd.      Pt seen and examined at bedside this AM without any complaints or events overnight, VSS, labs and telemetry reviewed and pt stable for discharge as discussed with Dr. Alston. Pt has received appropriate discharge instructions, including medication regimen, access site management and follow up with Dr. Collado & and Dr. Miranda in 1-2 weeks.

## 2023-05-07 NOTE — PROGRESS NOTE ADULT - PROBLEM SELECTOR PLAN 2
TSH low with normal T4  Check T3  Cont synthroid  Consider endocrine eval  Contributor to recurrent AF?

## 2023-05-07 NOTE — DISCHARGE NOTE PROVIDER - CARE PROVIDER_API CALL
Will Collado)  Cardiac Electrophysiology; Cardiovascular Disease  [address] [place] [address]  Phone: [phone]  Fax: [phone]  Established Patient  Follow Up Time: 2 weeks    Tenzin Miranda)  Cardiovascular Disease; Internal Medicine  Cardiology [facility], [address] [place] [address]  Phone: [phone]  Fax: [phone]  Established Patient  Follow Up Time: 2 weeks   Statement Selected

## 2023-05-07 NOTE — PATIENT PROFILE ADULT - FALL HARM RISK - HARM RISK INTERVENTIONS
Assistance OOB with selected safe patient handling equipment/Communicate Risk of Fall with Harm to all staff/Monitor for mental status changes/Monitor gait and stability/Provide patient with walking aids - walker, cane, crutches/Reinforce activity limits and safety measures with patient and family/Review medications for side effects contributing to fall risk/Sit up slowly, dangle for a short time, stand at bedside before walking/Tailored Fall Risk Interventions/Use of alarms - bed, chair and/or voice tab/Visual Cue: Yellow wristband and red socks/Bed in lowest position, wheels locked, appropriate side rails in place/Call bell, personal items and telephone in reach/Instruct patient to call for assistance before getting out of bed or chair/Non-slip footwear when patient is out of bed/[place] to call system/Physically safe environment - no spills, clutter or unnecessary equipment/Purposeful Proactive Rounding/Room/bathroom lighting operational, light cord in reach

## 2023-05-07 NOTE — DISCHARGE NOTE PROVIDER - CARE PROVIDERS DIRECT ADDRESSES
[email or web address]

## 2023-05-07 NOTE — H&P ADULT - NSHPLABSRESULTS_GEN_ALL_CORE
13.1   8.81  )-----------( 440      ( 07 May 2023 01:35 )             38.2   05-07    138  |  104  |  24<H>  ----------------------------<  139<H>  4.0   |  22  |  1.07    Ca    9.7      07 May 2023 01:35    TPro  7.5  /  Alb  4.1  /  TBili  0.2  /  DBili  x   /  AST  22  /  ALT  20  /  AlkPhos  97  05-07

## 2023-05-07 NOTE — DISCHARGE NOTE PROVIDER - NSDCFUSCHEDAPPT_GEN_ALL_CORE_FT
Tenzin Miranda Physician [facility]  [facility] [address]  Scheduled Appointment: 08/01/2023

## 2023-05-07 NOTE — H&P ADULT - ASSESSMENT
Having Cardiac Catheterization  You may have had chest pain (angina), dizziness, or other symptoms of heart trouble. To help diagnose your problem, your healthcare provider may advise a cardiac catheterization. This is a procedure that looks for a blockage or narrow area in the arteries around the heart. These can cause chest pain or a heart attack if not treated.   This common procedure may also be used to treat a heart problem. It may be done as a planned procedure if you have had chest pain in the past. Or it may be done right away to treat a suspected heart attack.      The catheter may be placed in the arm or the groin.   Before the procedure  · Tell your healthcare team what medicines you take and about any allergies you have.  · Follow any directions you are given for not eating or drinking before the procedure.    During the procedure  · Hair may be trimmed where the catheter will be inserted.  · You may be given medicine to relax before the procedure.  · You will receive a local anesthetic to prevent pain at the insertion site.  · A healthcare provider inserts a tube called a sheath into a blood vessel in your groin or arm.  · Through the sheath, a long, thin tube called a catheter is placed inside the artery. The catheter is then guided toward your heart under X-ray guidance.  · The catheter can then be used to measure pressures within the heart. It can take blood samples if needed. It can also be used to inject contrast into the heart arteries to look for blockages. This is called angiography.    After the procedure  · Your healthcare providers will tell you how long to lie down and keep the insertion site still.  · If the insertion site was in your groin, you may need to lie down with your leg still for 6 or more hours. If a suture or closure device such as a collagen plug is used on the artery site to close the site, you may be able to move sooner. This depends on any bleeding that occurs.  · A nurse  will check the insertion site and your blood pressure.  · You may be asked to drink fluid to help flush the contrast liquid out of your system.  · Have someone drive you home from the hospital.  · It’s normal to find a small bruise or lump at the insertion site. This should go away within a few weeks.    When to call your healthcare provider  Call your healthcare provider right away if you have any of the following:  · Chest pain (angina)  · Pain, swelling, redness, bleeding, or fluid leaking at the insertion site  · Severe pain, coldness, or a bluish color in the leg or arm that held the catheter  · Blood in your urine, black or sticky stools, or any other kind of bleeding  · Fever of 100.4°F ( 38.0°C) or higher, or as advised by your healthcare provider  Violet last reviewed this educational content on 7/1/2019  © 7583-1844 [employer]. [address] [place] [address]. All rights reserved. This information is not intended as a substitute for professional medical care. Always follow your healthcare professional's instructions.        Discharge Instructions for Heart Failure  The heart is a muscle that pumps oxygen-rich blood to all parts of the body. When you have heart failure, the heart is not able to pump as well as it should. Blood and fluid may back up into the lungs (congestive heart failure), and some parts of the body don’t get enough oxygen-rich blood to work normally. These problems lead to the symptoms of heart failure. Heart failure can occur due to an injury to the heart or from natural processes.  You can control symptoms of heart failure with some lifestyle changes and by following your doctor's advice.  Activity  Ask your healthcare provider about an exercise program. You can benefit from simple activities such as walking or gardening. Exercising most days of the week can make you feel better. Don't be discouraged if your progress is slow at first. Rest as needed. Stop  activity if you develop symptoms such as chest pain, lightheadedness, or significant shortness of breath. Find activities that you enjoy, such as brisk walking, dancing, swimming, or gardening. These will help you stay active and strengthen your heart.  Diet  Follow a heart healthy diet. And make sure to limit the salt (sodium) in your diet. Salt causes your body to hold water. This makes your heart work harder as there is more fluid for the heart to pump. Limit your salt by doing the following:  · Limit canned, dried, packaged, and fast foods.  · Don't add salt to your food.  · Season foods with herbs instead of salt.  · Watch how much liquids you drink. Drinking too much can make heart failure worse. Talk with your health care provider about how much you should drink each day.  · Limit the amount of alcohol you drink. It may harm your heart. Women should have no more than 1 drink a day and men should have no more than 2.  · When you eat out, request that your meals have no added salt.  Tobacco  If you smoke, it's very important to quit. Smoking increases your chances of having a heart attack by harming the blood vessels that provide oxygen to your heart. This makes heart failure worse. Quitting smoking is the number one thing you can do to improve your health. Enroll in a stop-smoking program to improve your chances of success. Talk with your healthcare provider about medicines or nicotine replacement therapy to help you quit smoking. Ask your healthcare provider about smoking cessation support groups.  Medicine  Take your medicines exactly as prescribed. Learn the names and purpose of each of your medicines. Keep an accurate medicine list and current dosages with you at all times. Don't skip doses. If you miss a dose of your medicine, take it as soon as you remember. If you miss a dose and it's almost time for your next dose, just wait and take your next dose at the normal time. Don't take a double dose. If you  are unsure, call your doctor's office. Make sure not to mix up your medicines or forget what you've taken the same day.  Weight monitoring  Weigh yourself every day. A sudden weight gain can mean your heart failure is getting worse. Weigh yourself at the same time of day and in the same kind of clothes. Ideally, weigh yourself first thing in the morning after you empty your bladder, but before you eat breakfast. Your healthcare provider will show you how to track your weight. He or she will also discuss with you when you should call if you have a sudden, unexpected increase in your weight.  In general, your healthcare provider may ask you to report if your weight goes up by more than 2 pounds in 1 day,  5 pounds in 1 week, or whatever weight gain you were told by your doctor. This is a sign that you are retaining more fluid than you should be. Clues to weight gain include checking your ankles for swelling, or noticing you are short of breath when you lie down.  Follow-up care  Make a follow-up appointment as directed. Depending on the type and severity of heart failure you have, you may need follow-up as early as 7 days from hospital discharge. Keep appointments for checkups and lab tests that are needed to check your medicines and condition.  Recognize that your health and even survival depend on your following medical recommendations.  Symptoms  Heart failure can cause a variety of symptoms, including:  · Shortness of breath  · Trouble breathing at night, especially when you lie down  · Swelling in the legs and feet or in the belly (abdomen)  · Becoming easily fatigued  · Irregular or rapid heartbeat  · Weakness or lightheadedness  · Swelling of the neck veins  It is important to know what to do if symptoms get worse or if you develop signs of worsening heart failure.     When to call your healthcare provider  Call your healthcare provider right away if you have any of these signs of worsening heart  failure:  · Sudden weight gain (more than 2 pounds in 1 day or 5 pounds in 1 week, or whatever weight gain you were told to report by your doctor)  · Trouble breathing not related to being active  · New or increased swelling of your legs or ankles  · Swelling or pain in your abdomen  · Breathing trouble at night (waking up short of breath, needing more pillows to breathe)  · Frequent coughing that doesn't go away  · Feeling much more tired than usual  Call 911  Call 911 right away if you have:  · Severe shortness of breath, such that you can't catch your breath even while resting  · Severe chest pain that does not resolve with rest or nitroglycerin  · Pink, foamy mucus with cough and shortness of breath  · A continuous rapid or irregular heartbeat  · Passing out or fainting  · Stroke symptoms such as sudden numbness or weakness on one side of your face, arm, or leg or sudden confusion, trouble speaking or vision changes   Date Last Reviewed: 3/21/2016  © 8317-9739 [employer]. [address] [place] [address], [place] [address]. All rights reserved. This information is not intended as a substitute for professional medical care. Always follow your healthcare professional's instructions.         Patient is a 62 year old female with breast CA-Plan to start RT in the near future, hypothyroidism HTN and PAF with recent PVI 10/2023 who presented with palliations and was admitted to cardiac telemetry for AF with RVR

## 2023-05-07 NOTE — PROGRESS NOTE ADULT - ASSESSMENT
Patient is a 62 year old female with breast CA-Plan to start RT in the near future, hypothyroidism HTN and PAF with recent PVI 10/2023 who presented with palliations and was admitted to cardiac telemetry for AF with RVR

## 2023-05-07 NOTE — DISCHARGE NOTE NURSING/CASE MANAGEMENT/SOCIAL WORK - PATIENT PORTAL LINK FT
You can access the FollowMyHealth Patient Portal offered by [facility] by registering at the following website: [email or web address]. By joining [employer]’s FollowMyHealth portal, you will also be able to view your health information using other applications (apps) compatible with our system.

## 2023-05-07 NOTE — ED PROVIDER NOTE - CLINICAL SUMMARY MEDICAL DECISION MAKING FREE TEXT BOX
62F PMH Hypothyroidism, HTN, small invasive duct R breast CA s/p lumpectomy/lymph node biopsy 4/11/2023, PAF s/p CRYO 10/24/22 (on eliquis, cards Dr. Collado) p/w palpitations. Pt states she has 1-2d of very brief fluttering of her heart (lasting seconds) but now w/ ~1hr of persistent feeling of heart racing, associated w/ mild HA, SOB, chest tightness - feels similar to prior episodes of afib. States her HR at home was 144, took 50mg of PO metoprolol. Pt states her metoprolol was dc'd ~1w ago. No other systemic symptoms.   Tachycardic, other vitals wnl. Exam as above.  ddx: Afib RVR, possibly related to recent medication change.   Labs, attempt rate control, CXR, reassess.

## 2023-05-07 NOTE — DISCHARGE NOTE PROVIDER - NSDCCPCAREPLAN_GEN_ALL_CORE_FT
PRINCIPAL DISCHARGE DIAGNOSIS  Diagnosis: Atrial fibrillation  Assessment and Plan of Treatment: You have an abnormal heart rhythm (arrhythmia) called atrial fibrillation. With this condition, the hearts 2 upper chambers (the atria) quiver rather than squeeze the blood out in a normal pattern. This leads to an irregular and sometimes rapid heartbeat. Atrial fibrillation is serious condition as it affects the heart’s ability to fill with blood as it should and blood clots may form, which increases the risk for stroke.  -Please CONTINUE  ELIQUIS 5MG TWICE A DAY to prevent a stroke.  -Please CONTINUE Metoprolol Tartrate 12.5mg TWICE A DAY to keep your heart rate regular  -Please follow up with Dr. Collado and Dr. Miranda in 1-2 weeks.         SECONDARY DISCHARGE DIAGNOSES  Diagnosis: HTN (hypertension)  Assessment and Plan of Treatment: Please continue home dose Losartan as listed to keep your blood pressure controlled. For blood pressure that is too high or too low please see your doctor or go to the emergency room as necessary.       PRINCIPAL DISCHARGE DIAGNOSIS  Diagnosis: Atrial fibrillation  Assessment and Plan of Treatment: You have an abnormal heart rhythm (arrhythmia) called atrial fibrillation. With this condition, the hearts 2 upper chambers (the atria) quiver rather than squeeze the blood out in a normal pattern. This leads to an irregular and sometimes rapid heartbeat. Atrial fibrillation is serious condition as it affects the heart’s ability to fill with blood as it should and blood clots may form, which increases the risk for stroke.  -Please CONTINUE  ELIQUIS 5MG TWICE A DAY to prevent a stroke.  -Please CONTINUE Metoprolol Tartrate 12.5mg ONCE A DAY to keep your heart rate regular  -Please follow up with Dr. Collado and Dr. Miranda in 1-2 weeks.         SECONDARY DISCHARGE DIAGNOSES  Diagnosis: HTN (hypertension)  Assessment and Plan of Treatment: Please continue home dose Losartan as listed to keep your blood pressure controlled. For blood pressure that is too high or too low please see your doctor or go to the emergency room as necessary.

## 2023-05-07 NOTE — DISCHARGE NOTE PROVIDER - NSDCMRMEDTOKEN_GEN_ALL_CORE_FT
Cozaar 25 mg oral tablet: 1 tab(s) orally once a day  Eliquis 5 mg oral tablet: 1 tab(s) orally 2 times a day   levothyroxine 175 mcg (0.175 mg) oral tablet: 1 tab(s) orally once a day  Metoprolol Tartrate 25 mg oral tablet: 0.5 tab(s) orally 2 times a day  Vitamin D3 50 mcg (2000 intl units) oral tablet: 1 tab(s) orally once a day   Cozaar 25 mg oral tablet: 1 tab(s) orally once a day  Eliquis 5 mg oral tablet: 1 tab(s) orally 2 times a day   levothyroxine 175 mcg (0.175 mg) oral tablet: 1 tab(s) orally once a day  metoprolol tartrate 25 mg oral tablet: 0.5 tab(s) orally once a day  Vitamin D3 50 mcg (2000 intl units) oral tablet: 1 tab(s) orally once a day

## 2023-05-07 NOTE — ED PROVIDER NOTE - OBJECTIVE STATEMENT
62F PMH Hypothyroidism, HTN, small invasive duct R breast CA s/p lumpectomy/lymph node biopsy 4/11/2023, PAF s/p CRYO 10/24/22 (on eliquis, cards Dr. Collado) p/w palpitations. Pt states she has 1-2d of very brief fluttering of her heart (lasting seconds) but now w/ ~1hr of persistent feeling of heart racing, associated w/ mild HA, SOB, chest tightness - feels similar to prior episodes of afib. States her HR at home was 144, took 50mg of PO metoprolol. Pt states her metoprolol was dc'd ~1w ago. No other systemic symptoms.   Denies associated nausea, vomiting, diarrhea, lightheaded, diaphoresis, cough, rhinorrhea, black stool, bloody stool, LE pain, LE swelling, focal weakness/numbness, recent travel/immobilization, abd pain, urinary complaints, f/c.

## 2023-05-07 NOTE — H&P ADULT - GASTROINTESTINAL
soft/nontender/nondistended/normal active bowel sounds/no guarding/no rigidity/no organomegaly/no palpable navdeep

## 2023-05-08 ENCOUNTER — APPOINTMENT (OUTPATIENT)
Dept: HEART AND VASCULAR | Facility: CLINIC | Age: 63
End: 2023-05-08
Payer: COMMERCIAL

## 2023-05-08 LAB
T3/T3 UPTAKE INDEX SERPL-RTO: 36 % — SIGNIFICANT CHANGE UP (ref 28–41)
T4/T3 UPTAKE INDEX SERPL: 1 TBI — SIGNIFICANT CHANGE UP (ref 0.8–1.3)

## 2023-05-08 PROCEDURE — 93248 EXT ECG>7D<15D REV&INTERPJ: CPT

## 2023-05-10 ENCOUNTER — APPOINTMENT (OUTPATIENT)
Dept: HEART AND VASCULAR | Facility: CLINIC | Age: 63
End: 2023-05-10
Payer: COMMERCIAL

## 2023-05-10 VITALS
DIASTOLIC BLOOD PRESSURE: 75 MMHG | WEIGHT: 227 LBS | HEART RATE: 61 BPM | TEMPERATURE: 98.3 F | BODY MASS INDEX: 30.75 KG/M2 | HEIGHT: 72 IN | OXYGEN SATURATION: 98 % | SYSTOLIC BLOOD PRESSURE: 100 MMHG

## 2023-05-10 PROCEDURE — 99213 OFFICE O/P EST LOW 20 MIN: CPT

## 2023-05-10 RX ORDER — DILTIAZEM HYDROCHLORIDE 30 MG/1
30 TABLET ORAL 4 TIMES DAILY
Qty: 30 | Refills: 3 | Status: DISCONTINUED | COMMUNITY
Start: 2022-08-29 | End: 2023-05-10

## 2023-05-10 NOTE — END OF VISIT
[FreeTextEntry3] : I, Ewa Marquez, am scribing for (in the presence of) Dr. Collado the following sections: HPI, PMH,Family/social history, ROS, Physical Exam, Assessment / Plan. \par \par I, Will Collado, personally performed the services described in the documentation, reviewed the documentation recorded by the scribe in my presence and it accurately and completely records my words and actions.

## 2023-05-11 DIAGNOSIS — I10 ESSENTIAL (PRIMARY) HYPERTENSION: ICD-10-CM

## 2023-05-11 DIAGNOSIS — Z85.3 PERSONAL HISTORY OF MALIGNANT NEOPLASM OF BREAST: ICD-10-CM

## 2023-05-11 DIAGNOSIS — R00.1 BRADYCARDIA, UNSPECIFIED: ICD-10-CM

## 2023-05-11 DIAGNOSIS — C50.919 MALIGNANT NEOPLASM OF UNSPECIFIED SITE OF UNSPECIFIED FEMALE BREAST: ICD-10-CM

## 2023-05-11 DIAGNOSIS — E03.9 HYPOTHYROIDISM, UNSPECIFIED: ICD-10-CM

## 2023-05-11 DIAGNOSIS — E66.9 OBESITY, UNSPECIFIED: ICD-10-CM

## 2023-05-11 DIAGNOSIS — Z82.49 FAMILY HISTORY OF ISCHEMIC HEART DISEASE AND OTHER DISEASES OF THE CIRCULATORY SYSTEM: ICD-10-CM

## 2023-05-11 DIAGNOSIS — I48.0 PAROXYSMAL ATRIAL FIBRILLATION: ICD-10-CM

## 2023-05-11 DIAGNOSIS — Z79.890 HORMONE REPLACEMENT THERAPY: ICD-10-CM

## 2023-06-06 ENCOUNTER — NON-APPOINTMENT (OUTPATIENT)
Age: 63
End: 2023-06-06

## 2023-06-06 ENCOUNTER — APPOINTMENT (OUTPATIENT)
Dept: HEART AND VASCULAR | Facility: CLINIC | Age: 63
End: 2023-06-06
Payer: COMMERCIAL

## 2023-06-06 VITALS
HEART RATE: 58 BPM | HEIGHT: 72 IN | SYSTOLIC BLOOD PRESSURE: 106 MMHG | OXYGEN SATURATION: 96 % | BODY MASS INDEX: 30.2 KG/M2 | WEIGHT: 223 LBS | DIASTOLIC BLOOD PRESSURE: 61 MMHG

## 2023-06-06 PROCEDURE — 93000 ELECTROCARDIOGRAM COMPLETE: CPT

## 2023-06-06 PROCEDURE — 99213 OFFICE O/P EST LOW 20 MIN: CPT | Mod: 25

## 2023-06-06 NOTE — HISTORY OF PRESENT ILLNESS
[FreeTextEntry1] : 62 y.o. female with h/o small invasive duct R breast CA s/p lumpectomy/lymph node biopsy 4/11/2023, PAF s/p CRYO 10/24/22 who presents for follow-up.  \par \par She had stopped Metoprolol as she was doing very well.  She then had recurrent AFib in early May that lasted for several hours.  Presented to [facility] ED and was rate controlled; spontaneously converted to sinus rhythm.  She is tolerating Eliquis without bleeding issues.  She has continued Metoprolol. \par \par Currently undergoing RT for breast CA.

## 2023-06-06 NOTE — END OF VISIT
[FreeTextEntry3] : I, wEa Marquez, am scribing for (in the presence of) Dr. Collado the following sections: HPI, PMH,Family/social history, ROS, Physical Exam, Assessment / Plan. \par \par I, Will Collado, personally performed the services described in the documentation, reviewed the documentation recorded by the scribe in my presence and it accurately and completely records my words and actions.

## 2023-06-06 NOTE — CARDIOLOGY SUMMARY
[de-identified] : 6/6/23 SB @ 51 bpm \par 4/28/2023: SB 53 bpm\par 11/22/22: Sinus bradycardia @ 46 bpm

## 2023-06-06 NOTE — ADDENDUM
[FreeTextEntry1] : I, Marily Ruff, am scribing for and the presence of Dr. Collado the following sections: HPI, PMH,Family/social history, ROS, Physical Exam, Assessment / Plan.\par \par I, Will Collado, personally performed the services described in the documentation, reviewed the documentation recorded by the scribe in my presence and it accurately and completely records my words and actions.\par

## 2023-06-26 NOTE — ED ADULT TRIAGE NOTE - TEMPERATURE IN FAHRENHEIT (DEGREES F)
97.6 Abbe Flap (Upper To Lower Lip) Text: The defect of the lower lip was assessed and measured.  Given the location and size of the defect, an Abbe flap was deemed most appropriate. Using a sterile surgical marker, an appropriate Abbe flap was measured and drawn on the upper lip. Local anesthesia was then infiltrated.  A scalpel was then used to incise the upper lip through and through the skin, vermilion, muscle and mucosa, leaving the flap pedicled on the opposite side.  The flap was then rotated and transferred to the lower lip defect.  The flap was then sutured into place with a three layer technique, closing the orbicularis oris muscle layer with subcutaneous buried sutures, followed by a mucosal layer and an epidermal layer.

## 2023-07-03 NOTE — H&P ADULT - NSHPPOADEEPVENOUSTHROMB_GEN_A_CORE
[No Acute Distress] : no acute distress [Well Nourished] : well nourished [Well Developed] : well developed [Well-Appearing] : well-appearing [Normal Sclera/Conjunctiva] : normal sclera/conjunctiva [PERRL] : pupils equal round and reactive to light [EOMI] : extraocular movements intact [Normal Outer Ear/Nose] : the outer ears and nose were normal in appearance [Normal Oropharynx] : the oropharynx was normal no [Normal TMs] : both tympanic membranes were normal [Normal Nasal Mucosa] : the nasal mucosa was normal [No Lymphadenopathy] : no lymphadenopathy [Supple] : supple [Thyroid Normal, No Nodules] : the thyroid was normal and there were no nodules present [No Respiratory Distress] : no respiratory distress  [No Accessory Muscle Use] : no accessory muscle use [Clear to Auscultation] : lungs were clear to auscultation bilaterally [Normal Rate] : normal rate  [Regular Rhythm] : with a regular rhythm [Normal S1, S2] : normal S1 and S2 [No Edema] : there was no peripheral edema [Soft] : abdomen soft [Non Tender] : non-tender [Non-distended] : non-distended [Normal Bowel Sounds] : normal bowel sounds [Normal Posterior Cervical Nodes] : no posterior cervical lymphadenopathy [Normal Anterior Cervical Nodes] : no anterior cervical lymphadenopathy [No CVA Tenderness] : no CVA  tenderness [No Spinal Tenderness] : no spinal tenderness [Grossly Normal Strength/Tone] : grossly normal strength/tone [No Joint Swelling] : no joint swelling [No Rash] : no rash [Coordination Grossly Intact] : coordination grossly intact [Normal Gait] : normal gait [Normal Affect] : the affect was normal [Normal Insight/Judgement] : insight and judgment were intact [de-identified] : +obese

## 2023-07-05 ENCOUNTER — RX RENEWAL (OUTPATIENT)
Age: 63
End: 2023-07-05

## 2023-07-05 RX ORDER — METOPROLOL TARTRATE 25 MG/1
25 TABLET, FILM COATED ORAL
Qty: 90 | Refills: 3 | Status: ACTIVE | COMMUNITY
Start: 2023-07-05 | End: 1900-01-01

## 2023-07-06 NOTE — HISTORY OF PRESENT ILLNESS
[FreeTextEntry1] : 62 F hypothyroidism\par \par Had episode of palpitations Dec 2020 that lasted 4 hours, noted on her pulse ox that her hr was jumping up and down.  Saw a cardiologist in January, did a 48 hour holter no afib or any sustained arrhythmias.  No echo or stress testing done.  Feels generalized fatigue, notes this has gotten worse over the last 3 months.  Feels very stressed due to her job and taking care of her brother who has mental illness. Notes chronic chest tightness, thinks it is due to stress.  Notes occasional brief palpitations.  No sob.  Can walk > 2 flights of stairs, no limitations, though says she gets tired and needs to rest after 3 flights.  \par \par Fhx: Mother CHF, Father CAD age 60\par  \par  \par  2/9/22       In [facility] Jan 2022 dx with PAF. also dx with PSVT on [address].  She currently does not exercise but likes to swim and bike in Summer.  Just given Amio by PCP wc she has not started.\par \par 6/21/22 In NSR\par \par 1/25/23  Had A fib July and Aug 2022.  S/P Ablation Oct 2022. Maintaining NSR.  She reports a low TSH\par \par 3/22/23  For a right lumpectomy [place] [facility] [place]11[place]23 with Dr Thierry Fritz\par 5/10/23 That went well, to have RT to right breast followed by aromatase inhibitor.  Metoprolol was DCed by Dr Collado on 4/29/23 .  Then AF developed on May 6th.  Went to ER at 1 AM  on MAY 7th.

## 2023-07-06 NOTE — REASON FOR VISIT
[Symptom and Test Evaluation] : symptom and test evaluation [Arrhythmia/ECG Abnorrmalities] : arrhythmia/ECG abnormalities [FreeTextEntry1] : PCP- Lisandro Gates\par GI- Dr Lopez\par GYN- [facility]

## 2023-07-06 NOTE — ASSESSMENT
[FreeTextEntry1] : PreOp- Cleared for breast surgery 4/11/23 [place] [facility] [place] Fritz.  Will hold Eliquis for 7 doses,3 days before plus the morning of the surgery. \par \par Cleared for Exercise Program- Pt may participate in an exercise program/study @ [facility].  There are no contraindications to exercise.\par \par  PAF- On Eliquis and BB, I discourage Amio that was given to her elsewhere.  Ablation discussed for both arrhythmias. 2 bouts of A Fib, s/p ablation Oct 2022. Currently doing well on BB and Eliquis. Bout of AF May 6th after stopping Lopressor. Converted back to NSR on her own. Back on Lopressor\par \par PSVT- Seen on [facility] Monitor\par \par Mild to Moderate MR- Seen on Echo Sept 2021.  BP controlled.\par \par Health Maintainence- colonoscopy Oct 2021 with Dr Lopez.\par

## 2023-07-22 NOTE — H&P ADULT - PROBLEM SELECTOR PLAN 2
Thanks for letting us take care of you today!  It is our goal to give you courteous care and to keep you comfortable and informed, if you have any questions before you leave I will be happy to try and answer them.    Here is some advice after your visit:      Your visit in the emergency department is NOT definitive care - please follow-up with your primary care doctor and/or specialist within 1 week.  Please return if you have any worsening in your condition or if you have any other concerns.    Please review any LAB WORK from your visit today with your primary care physician.     TSH low with normal T4  Check T3  Cont synthroid  Consider endocrine eval  Contributor to recurrent AF?

## 2023-07-26 ENCOUNTER — APPOINTMENT (OUTPATIENT)
Dept: CT IMAGING | Facility: HOSPITAL | Age: 63
End: 2023-07-26

## 2023-07-26 ENCOUNTER — OUTPATIENT (OUTPATIENT)
Dept: OUTPATIENT SERVICES | Facility: HOSPITAL | Age: 63
LOS: 1 days | End: 2023-07-26
Payer: COMMERCIAL

## 2023-07-26 LAB — POCT ISTAT CREATININE: 1 MG/DL — SIGNIFICANT CHANGE UP (ref 0.5–1.3)

## 2023-07-26 PROCEDURE — 82565 ASSAY OF CREATININE: CPT

## 2023-07-26 PROCEDURE — 75574 CT ANGIO HRT W/3D IMAGE: CPT

## 2023-07-26 PROCEDURE — 75574 CT ANGIO HRT W/3D IMAGE: CPT | Mod: 26

## 2023-08-01 ENCOUNTER — APPOINTMENT (OUTPATIENT)
Dept: HEART AND VASCULAR | Facility: CLINIC | Age: 63
End: 2023-08-01
Payer: COMMERCIAL

## 2023-08-01 VITALS
HEART RATE: 60 BPM | SYSTOLIC BLOOD PRESSURE: 106 MMHG | OXYGEN SATURATION: 98 % | HEIGHT: 72 IN | DIASTOLIC BLOOD PRESSURE: 70 MMHG | BODY MASS INDEX: 29.8 KG/M2 | TEMPERATURE: 97.8 F | WEIGHT: 220 LBS

## 2023-08-01 PROCEDURE — 99213 OFFICE O/P EST LOW 20 MIN: CPT

## 2023-08-01 RX ORDER — PROPRANOLOL HYDROCHLORIDE 10 MG/1
10 TABLET ORAL
Qty: 30 | Refills: 3 | Status: DISCONTINUED | COMMUNITY
Start: 2022-07-19 | End: 2023-08-01

## 2023-08-01 RX ORDER — METOPROLOL TARTRATE 25 MG/1
25 TABLET, FILM COATED ORAL TWICE DAILY
Qty: 90 | Refills: 3 | Status: DISCONTINUED | COMMUNITY
Start: 2023-05-10 | End: 2023-08-01

## 2023-08-01 RX ORDER — LOSARTAN POTASSIUM 25 MG/1
25 TABLET, FILM COATED ORAL DAILY
Qty: 90 | Refills: 3 | Status: DISCONTINUED | COMMUNITY
Start: 2023-03-13 | End: 2023-08-01

## 2023-08-01 RX ORDER — LETROZOLE TABLETS 2.5 MG/1
2.5 TABLET, FILM COATED ORAL DAILY
Refills: 0 | Status: ACTIVE | COMMUNITY

## 2023-08-01 NOTE — HISTORY OF PRESENT ILLNESS
[FreeTextEntry1] : 62 F hypothyroidism  Had episode of palpitations Dec 2020 that lasted 4 hours, noted on her pulse ox that her hr was jumping up and down.  Saw a cardiologist in January, did a 48 hour holter no afib or any sustained arrhythmias.  No echo or stress testing done.  Feels generalized fatigue, notes this has gotten worse over the last 3 months.  Feels very stressed due to her job and taking care of her brother who has mental illness. Notes chronic chest tightness, thinks it is due to stress.  Notes occasional brief palpitations.  No sob.  Can walk > 2 flights of stairs, no limitations, though says she gets tired and needs to rest after 3 flights.    Fhx: Mother CHF, Father CAD age 60      2/9/22       In [facility] Jan 2022 dx with PAF. also dx with PSVT on Scytl.  She currently does not exercise but likes to swim and bike in Summer.  Just given Amio by PCP wc she has not started.  6/21/22 In [place]  1/25/23  Had A fib July and Aug 2022.  S/P Ablation Oct 2022. Maintaining NSR.  She reports a low TSH  3/22/23  [place] [facility] [place] Dr Thierry Fritz 5/10/23 That went well, to have RT to right breast followed by aromatase inhibitor.  Metoprolol was DCed by Dr Clolado on 4/29/23 .  Then AF developed on May 6th.  Went to ER at 1 AM  on MAY 7th.  8/1/23 -A fib broke on its own in the hospital. felt some chest tightness and had a CTA - mild stenosis. CAC -0. some chest tightness with stairs. made major changes to her diet. has been exercising frequently. occasional palps when going to sleep-typically last from 3-10 seconds. no dizziness. tracking arrythmias on her apple watch

## 2023-08-01 NOTE — ASSESSMENT
[FreeTextEntry1] : PreOp- Cleared for breast surgery 4/11/23 @ [facility] [place] with Dr Fritz.  Will hold Eliquis for 7 doses,3 days before plus the morning of the surgery.   Cleared for Exercise Program- Pt may participate in an exercise program/study @ [facility] [place].  There are no contraindications to exercise.   PAF- On Eliquis and BB, I discourage Amio that was given to her elsewhere.  Ablation discussed for both arrhythmias. 2 bouts of A Fib, s/p ablation Oct 2022. Currently doing well on BB and Eliquis. Bout of AF May 6th after stopping Lopressor. Converted back to NSR on her own. Back on Lopressor. 8/1 -no recurrence of afib   PSVT- Seen on Peerbridge Monitor  Mild to Moderate MR- Seen on Echo Sept 2021.  BP controlled. 8/1 -will repeat echo   chest tightness -8/1/23 -CTA done. CAC -0. low risk for cardiac CP. will monitor.  (Had RT to right breast only)  Health Maintainence- colonoscopy Oct 2021 with Dr Lopez.

## 2023-08-07 ENCOUNTER — APPOINTMENT (OUTPATIENT)
Dept: HEART AND VASCULAR | Facility: CLINIC | Age: 63
End: 2023-08-07
Payer: COMMERCIAL

## 2023-08-07 VITALS
BODY MASS INDEX: 30.07 KG/M2 | HEIGHT: 72 IN | WEIGHT: 221.98 LBS | HEART RATE: 51 BPM | SYSTOLIC BLOOD PRESSURE: 108 MMHG | DIASTOLIC BLOOD PRESSURE: 64 MMHG | OXYGEN SATURATION: 98 %

## 2023-08-07 PROCEDURE — 93306 TTE W/DOPPLER COMPLETE: CPT

## 2023-10-24 ENCOUNTER — NON-APPOINTMENT (OUTPATIENT)
Age: 63
End: 2023-10-24

## 2023-10-24 ENCOUNTER — APPOINTMENT (OUTPATIENT)
Dept: HEART AND VASCULAR | Facility: CLINIC | Age: 63
End: 2023-10-24
Payer: COMMERCIAL

## 2023-10-24 VITALS — TEMPERATURE: 97.8 F | HEIGHT: 72 IN | WEIGHT: 218 LBS | BODY MASS INDEX: 29.53 KG/M2

## 2023-10-24 VITALS — HEART RATE: 54 BPM | DIASTOLIC BLOOD PRESSURE: 64 MMHG | SYSTOLIC BLOOD PRESSURE: 113 MMHG

## 2023-10-24 DIAGNOSIS — R00.1 BRADYCARDIA, UNSPECIFIED: ICD-10-CM

## 2023-10-24 PROCEDURE — 99213 OFFICE O/P EST LOW 20 MIN: CPT | Mod: 25

## 2023-10-24 PROCEDURE — 93000 ELECTROCARDIOGRAM COMPLETE: CPT

## 2023-11-17 ENCOUNTER — EMERGENCY (EMERGENCY)
Facility: HOSPITAL | Age: 63
LOS: 1 days | Discharge: HOME | End: 2023-11-17
Attending: EMERGENCY MEDICINE | Admitting: EMERGENCY MEDICINE
Payer: COMMERCIAL

## 2023-11-17 VITALS
HEIGHT: 72 IN | TEMPERATURE: 98 F | SYSTOLIC BLOOD PRESSURE: 117 MMHG | OXYGEN SATURATION: 96 % | WEIGHT: 219.58 LBS | RESPIRATION RATE: 18 BRPM | HEART RATE: 107 BPM | DIASTOLIC BLOOD PRESSURE: 79 MMHG

## 2023-11-17 PROCEDURE — 99291 CRITICAL CARE FIRST HOUR: CPT

## 2023-11-17 NOTE — ED ADULT TRIAGE NOTE - CHIEF COMPLAINT QUOTE
Pt presents to the ED with complaints of palpitations that started 45 mins prior to arrival. Pt took her Eliquis 5mg and metoprolol 12.5mg at 11pm, took an additional metoprolol 25mg after feeling palpitations. Pt has a history of afib, had an ablation 13 months ago.

## 2023-11-18 VITALS — HEART RATE: 88 BPM | SYSTOLIC BLOOD PRESSURE: 103 MMHG | OXYGEN SATURATION: 97 % | DIASTOLIC BLOOD PRESSURE: 64 MMHG

## 2023-11-18 DIAGNOSIS — Z98.890 OTHER SPECIFIED POSTPROCEDURAL STATES: Chronic | ICD-10-CM

## 2023-11-18 LAB
ALBUMIN SERPL ELPH-MCNC: 4.2 G/DL — SIGNIFICANT CHANGE UP (ref 3.3–5)
ALBUMIN SERPL ELPH-MCNC: 4.2 G/DL — SIGNIFICANT CHANGE UP (ref 3.3–5)
ALP SERPL-CCNC: 109 U/L — SIGNIFICANT CHANGE UP (ref 40–120)
ALP SERPL-CCNC: 109 U/L — SIGNIFICANT CHANGE UP (ref 40–120)
ALT FLD-CCNC: 20 U/L — SIGNIFICANT CHANGE UP (ref 10–45)
ALT FLD-CCNC: 20 U/L — SIGNIFICANT CHANGE UP (ref 10–45)
ANION GAP SERPL CALC-SCNC: 11 MMOL/L — SIGNIFICANT CHANGE UP (ref 5–17)
ANION GAP SERPL CALC-SCNC: 11 MMOL/L — SIGNIFICANT CHANGE UP (ref 5–17)
APTT BLD: 37 SEC — HIGH (ref 24.5–35.6)
APTT BLD: 37 SEC — HIGH (ref 24.5–35.6)
AST SERPL-CCNC: 25 U/L — SIGNIFICANT CHANGE UP (ref 10–40)
AST SERPL-CCNC: 25 U/L — SIGNIFICANT CHANGE UP (ref 10–40)
BASOPHILS # BLD AUTO: 0.04 K/UL — SIGNIFICANT CHANGE UP (ref 0–0.2)
BASOPHILS # BLD AUTO: 0.04 K/UL — SIGNIFICANT CHANGE UP (ref 0–0.2)
BASOPHILS NFR BLD AUTO: 0.5 % — SIGNIFICANT CHANGE UP (ref 0–2)
BASOPHILS NFR BLD AUTO: 0.5 % — SIGNIFICANT CHANGE UP (ref 0–2)
BILIRUB SERPL-MCNC: 0.3 MG/DL — SIGNIFICANT CHANGE UP (ref 0.2–1.2)
BILIRUB SERPL-MCNC: 0.3 MG/DL — SIGNIFICANT CHANGE UP (ref 0.2–1.2)
BUN SERPL-MCNC: 31 MG/DL — HIGH (ref 7–23)
BUN SERPL-MCNC: 31 MG/DL — HIGH (ref 7–23)
CALCIUM SERPL-MCNC: 9.8 MG/DL — SIGNIFICANT CHANGE UP (ref 8.4–10.5)
CALCIUM SERPL-MCNC: 9.8 MG/DL — SIGNIFICANT CHANGE UP (ref 8.4–10.5)
CHLORIDE SERPL-SCNC: 105 MMOL/L — SIGNIFICANT CHANGE UP (ref 96–108)
CHLORIDE SERPL-SCNC: 105 MMOL/L — SIGNIFICANT CHANGE UP (ref 96–108)
CO2 SERPL-SCNC: 22 MMOL/L — SIGNIFICANT CHANGE UP (ref 22–31)
CO2 SERPL-SCNC: 22 MMOL/L — SIGNIFICANT CHANGE UP (ref 22–31)
CREAT SERPL-MCNC: 1.02 MG/DL — SIGNIFICANT CHANGE UP (ref 0.5–1.3)
CREAT SERPL-MCNC: 1.02 MG/DL — SIGNIFICANT CHANGE UP (ref 0.5–1.3)
EGFR: 61 ML/MIN/1.73M2 — SIGNIFICANT CHANGE UP
EGFR: 61 ML/MIN/1.73M2 — SIGNIFICANT CHANGE UP
EOSINOPHIL # BLD AUTO: 0.23 K/UL — SIGNIFICANT CHANGE UP (ref 0–0.5)
EOSINOPHIL # BLD AUTO: 0.23 K/UL — SIGNIFICANT CHANGE UP (ref 0–0.5)
EOSINOPHIL NFR BLD AUTO: 2.8 % — SIGNIFICANT CHANGE UP (ref 0–6)
EOSINOPHIL NFR BLD AUTO: 2.8 % — SIGNIFICANT CHANGE UP (ref 0–6)
GLUCOSE SERPL-MCNC: 130 MG/DL — HIGH (ref 70–99)
GLUCOSE SERPL-MCNC: 130 MG/DL — HIGH (ref 70–99)
HCT VFR BLD CALC: 37.7 % — SIGNIFICANT CHANGE UP (ref 34.5–45)
HCT VFR BLD CALC: 37.7 % — SIGNIFICANT CHANGE UP (ref 34.5–45)
HGB BLD-MCNC: 13 G/DL — SIGNIFICANT CHANGE UP (ref 11.5–15.5)
HGB BLD-MCNC: 13 G/DL — SIGNIFICANT CHANGE UP (ref 11.5–15.5)
IMM GRANULOCYTES NFR BLD AUTO: 0.2 % — SIGNIFICANT CHANGE UP (ref 0–0.9)
IMM GRANULOCYTES NFR BLD AUTO: 0.2 % — SIGNIFICANT CHANGE UP (ref 0–0.9)
INR BLD: 1.18 — SIGNIFICANT CHANGE UP (ref 0.85–1.18)
INR BLD: 1.18 — SIGNIFICANT CHANGE UP (ref 0.85–1.18)
LYMPHOCYTES # BLD AUTO: 2.89 K/UL — SIGNIFICANT CHANGE UP (ref 1–3.3)
LYMPHOCYTES # BLD AUTO: 2.89 K/UL — SIGNIFICANT CHANGE UP (ref 1–3.3)
LYMPHOCYTES # BLD AUTO: 35.2 % — SIGNIFICANT CHANGE UP (ref 13–44)
LYMPHOCYTES # BLD AUTO: 35.2 % — SIGNIFICANT CHANGE UP (ref 13–44)
MAGNESIUM SERPL-MCNC: 2 MG/DL — SIGNIFICANT CHANGE UP (ref 1.6–2.6)
MAGNESIUM SERPL-MCNC: 2 MG/DL — SIGNIFICANT CHANGE UP (ref 1.6–2.6)
MCHC RBC-ENTMCNC: 30.1 PG — SIGNIFICANT CHANGE UP (ref 27–34)
MCHC RBC-ENTMCNC: 30.1 PG — SIGNIFICANT CHANGE UP (ref 27–34)
MCHC RBC-ENTMCNC: 34.5 GM/DL — SIGNIFICANT CHANGE UP (ref 32–36)
MCHC RBC-ENTMCNC: 34.5 GM/DL — SIGNIFICANT CHANGE UP (ref 32–36)
MCV RBC AUTO: 87.3 FL — SIGNIFICANT CHANGE UP (ref 80–100)
MCV RBC AUTO: 87.3 FL — SIGNIFICANT CHANGE UP (ref 80–100)
MONOCYTES # BLD AUTO: 0.77 K/UL — SIGNIFICANT CHANGE UP (ref 0–0.9)
MONOCYTES # BLD AUTO: 0.77 K/UL — SIGNIFICANT CHANGE UP (ref 0–0.9)
MONOCYTES NFR BLD AUTO: 9.4 % — SIGNIFICANT CHANGE UP (ref 2–14)
MONOCYTES NFR BLD AUTO: 9.4 % — SIGNIFICANT CHANGE UP (ref 2–14)
NEUTROPHILS # BLD AUTO: 4.26 K/UL — SIGNIFICANT CHANGE UP (ref 1.8–7.4)
NEUTROPHILS # BLD AUTO: 4.26 K/UL — SIGNIFICANT CHANGE UP (ref 1.8–7.4)
NEUTROPHILS NFR BLD AUTO: 51.9 % — SIGNIFICANT CHANGE UP (ref 43–77)
NEUTROPHILS NFR BLD AUTO: 51.9 % — SIGNIFICANT CHANGE UP (ref 43–77)
NRBC # BLD: 0 /100 WBCS — SIGNIFICANT CHANGE UP (ref 0–0)
NRBC # BLD: 0 /100 WBCS — SIGNIFICANT CHANGE UP (ref 0–0)
PLATELET # BLD AUTO: 352 K/UL — SIGNIFICANT CHANGE UP (ref 150–400)
PLATELET # BLD AUTO: 352 K/UL — SIGNIFICANT CHANGE UP (ref 150–400)
POTASSIUM SERPL-MCNC: 4 MMOL/L — SIGNIFICANT CHANGE UP (ref 3.5–5.3)
POTASSIUM SERPL-MCNC: 4 MMOL/L — SIGNIFICANT CHANGE UP (ref 3.5–5.3)
POTASSIUM SERPL-SCNC: 4 MMOL/L — SIGNIFICANT CHANGE UP (ref 3.5–5.3)
POTASSIUM SERPL-SCNC: 4 MMOL/L — SIGNIFICANT CHANGE UP (ref 3.5–5.3)
PROT SERPL-MCNC: 7.8 G/DL — SIGNIFICANT CHANGE UP (ref 6–8.3)
PROT SERPL-MCNC: 7.8 G/DL — SIGNIFICANT CHANGE UP (ref 6–8.3)
PROTHROM AB SERPL-ACNC: 13.4 SEC — HIGH (ref 9.5–13)
PROTHROM AB SERPL-ACNC: 13.4 SEC — HIGH (ref 9.5–13)
RBC # BLD: 4.32 M/UL — SIGNIFICANT CHANGE UP (ref 3.8–5.2)
RBC # BLD: 4.32 M/UL — SIGNIFICANT CHANGE UP (ref 3.8–5.2)
RBC # FLD: 12.7 % — SIGNIFICANT CHANGE UP (ref 10.3–14.5)
RBC # FLD: 12.7 % — SIGNIFICANT CHANGE UP (ref 10.3–14.5)
SODIUM SERPL-SCNC: 138 MMOL/L — SIGNIFICANT CHANGE UP (ref 135–145)
SODIUM SERPL-SCNC: 138 MMOL/L — SIGNIFICANT CHANGE UP (ref 135–145)
TROPONIN T, HIGH SENSITIVITY RESULT: 8 NG/L — SIGNIFICANT CHANGE UP (ref 0–51)
TROPONIN T, HIGH SENSITIVITY RESULT: 8 NG/L — SIGNIFICANT CHANGE UP (ref 0–51)
TROPONIN T, HIGH SENSITIVITY RESULT: 9 NG/L — SIGNIFICANT CHANGE UP (ref 0–51)
TROPONIN T, HIGH SENSITIVITY RESULT: 9 NG/L — SIGNIFICANT CHANGE UP (ref 0–51)
WBC # BLD: 8.21 K/UL — SIGNIFICANT CHANGE UP (ref 3.8–10.5)
WBC # BLD: 8.21 K/UL — SIGNIFICANT CHANGE UP (ref 3.8–10.5)
WBC # FLD AUTO: 8.21 K/UL — SIGNIFICANT CHANGE UP (ref 3.8–10.5)
WBC # FLD AUTO: 8.21 K/UL — SIGNIFICANT CHANGE UP (ref 3.8–10.5)

## 2023-11-18 PROCEDURE — 85730 THROMBOPLASTIN TIME PARTIAL: CPT

## 2023-11-18 PROCEDURE — 36415 COLL VENOUS BLD VENIPUNCTURE: CPT

## 2023-11-18 PROCEDURE — 80053 COMPREHEN METABOLIC PANEL: CPT

## 2023-11-18 PROCEDURE — 93010 ELECTROCARDIOGRAM REPORT: CPT

## 2023-11-18 PROCEDURE — 85610 PROTHROMBIN TIME: CPT

## 2023-11-18 PROCEDURE — 84484 ASSAY OF TROPONIN QUANT: CPT

## 2023-11-18 PROCEDURE — 93005 ELECTROCARDIOGRAM TRACING: CPT

## 2023-11-18 PROCEDURE — 96375 TX/PRO/DX INJ NEW DRUG ADDON: CPT

## 2023-11-18 PROCEDURE — 71045 X-RAY EXAM CHEST 1 VIEW: CPT

## 2023-11-18 PROCEDURE — 96374 THER/PROPH/DIAG INJ IV PUSH: CPT

## 2023-11-18 PROCEDURE — 85025 COMPLETE CBC W/AUTO DIFF WBC: CPT

## 2023-11-18 PROCEDURE — 99291 CRITICAL CARE FIRST HOUR: CPT | Mod: 25

## 2023-11-18 PROCEDURE — 71045 X-RAY EXAM CHEST 1 VIEW: CPT | Mod: 26

## 2023-11-18 PROCEDURE — 83735 ASSAY OF MAGNESIUM: CPT

## 2023-11-18 RX ORDER — DILTIAZEM HCL 120 MG
10 CAPSULE, EXT RELEASE 24 HR ORAL ONCE
Refills: 0 | Status: COMPLETED | OUTPATIENT
Start: 2023-11-18 | End: 2023-11-18

## 2023-11-18 RX ORDER — METOPROLOL TARTRATE 50 MG
5 TABLET ORAL ONCE
Refills: 0 | Status: COMPLETED | OUTPATIENT
Start: 2023-11-18 | End: 2023-11-18

## 2023-11-18 RX ORDER — ACETAMINOPHEN 500 MG
975 TABLET ORAL ONCE
Refills: 0 | Status: COMPLETED | OUTPATIENT
Start: 2023-11-18 | End: 2023-11-18

## 2023-11-18 RX ORDER — SODIUM CHLORIDE 9 MG/ML
500 INJECTION INTRAMUSCULAR; INTRAVENOUS; SUBCUTANEOUS ONCE
Refills: 0 | Status: COMPLETED | OUTPATIENT
Start: 2023-11-18 | End: 2023-11-18

## 2023-11-18 RX ADMIN — SODIUM CHLORIDE 500 MILLILITER(S): 9 INJECTION INTRAMUSCULAR; INTRAVENOUS; SUBCUTANEOUS at 02:10

## 2023-11-18 RX ADMIN — Medication 5 MILLIGRAM(S): at 00:43

## 2023-11-18 RX ADMIN — Medication 10 MILLIGRAM(S): at 03:54

## 2023-11-18 RX ADMIN — Medication 975 MILLIGRAM(S): at 02:10

## 2023-11-18 NOTE — ED ADULT NURSE NOTE - OBJECTIVE STATEMENT
Received a 63 year old female with a chief complaint of palpitations onset last evening. Patient with a PMH of AFIB, s/p cardiac ablation (patient under PO Metoprolol and PO Eliquis). Patient reports that she has been compliant with her PO Medications. Reports that she has had about three to four instances of "palpitations" in the past. Patient reports that she just needed extra medication of metoprolol in the past which regulated her heart. Patient denies chest pain, shortness of breath, dizziness. HR on cardiac monitor AIFB  upon contact in the clinical area.

## 2023-11-18 NOTE — ED PROVIDER NOTE - PATIENT PORTAL LINK FT
You can access the FollowMyHealth Patient Portal offered by [facility] by registering at the following website: [email or web address]. By joining [employer]’s FollowMyHealth portal, you will also be able to view your health information using other applications (apps) compatible with our system.

## 2023-11-18 NOTE — ED PROVIDER NOTE - CLINICAL SUMMARY MEDICAL DECISION MAKING FREE TEXT BOX
palpitations, chest pain, sob, noted to be back in afib, 110s-120s HR, no resp distress, no hypoxia. doubt PE.   -check labs  -ekg  -cxr  -metoprolol iv for rate control

## 2023-11-18 NOTE — ED PROVIDER NOTE - NSICDXPASTMEDICALHX_GEN_ALL_CORE_FT
PAST MEDICAL HISTORY:  Borderline hypertension     Breast cancer     Hypothyroidism     Obesity     Paroxysmal atrial fibrillation

## 2023-11-18 NOTE — ED ADULT NURSE NOTE - NS ED NURSE DC PT EDUCATION RESOURCES
Wife, Kaylen, called clinic with fax number for referral: [phone]. Attn: Dr. Lara. Referral order faxed.    Tiffany Jovel RN  [facility]   Yes

## 2023-11-18 NOTE — ED PROVIDER NOTE - OBJECTIVE STATEMENT
63F hx afib (ablation 10/22), htn, hypothyroid, breast ca (s/p lumpectomy, radiation), c/o palpitations. pt states she took her nightly eliquis and metoprolol, however when she went to lay down for sleep felt like her heart was racing. pt states heart rate went up to 140s. states took extra metoprolol 25mg. some chest pain and SOB with palpitations. no n/v/d. no fevers. came back from [place] last week. no sick contacts.

## 2023-11-18 NOTE — ED PROVIDER NOTE - NSFOLLOWUPINSTRUCTIONS_ED_ALL_ED_FT
Follow-up with Dr. Collado    A-fib (Atrial Fibrillation)    WHAT YOU NEED TO KNOW:    Atrial fibrillation (A-fib) is an irregular heartbeat. It reduces your heart's ability to pump blood through your body. A-fib may come and go, or it may be a long-term condition. A-fib can cause blood clots, stroke, or heart failure. These conditions may become life-threatening. It is important to treat and manage A-fib to help prevent a blood clot, stroke, or heart failure.    DISCHARGE INSTRUCTIONS:    Call your local emergency number (911 in the [place]) or have someone call if:    You have any of the following signs of a heart attack:  Squeezing, pressure, or pain in your chest    You may also have any of the following:  Discomfort or pain in your back, neck, jaw, stomach, or arm    Shortness of breath    Nausea or vomiting    Lightheadedness or a sudden cold sweat    You have any of the following signs of a stroke:  Numbness or drooping on one side of your face    Weakness in an arm or leg    Confusion or difficulty speaking    Dizziness, a severe headache, or vision loss  Return to the emergency department if:    Your arm or leg feels warm, tender, and painful. It may look swollen and red.    Your heart rate is more than 110 beats per minute.    You are short of breath, even at rest.  Call your doctor or cardiologist if:    You have new or worsening swelling in your legs, feet, ankles, or abdomen.    You have questions or concerns about your condition or care.  Medicines: You may need any of the following:    Heart medicines help control your heart rate or rhythm. You may need more than one medicine to treat your symptoms.    Blood thinners help prevent blood clots. Clots can cause strokes, heart attacks, and death. Many types of blood thinners are available. Your healthcare provider will give you specific instructions for the type you are given. The following are general safety guidelines to follow while you are taking a blood thinner:  Watch for bleeding and bruising. Watch for bleeding from your gums or nose. Watch for blood in your urine and bowel movements. Use a soft washcloth on your skin, and a soft toothbrush to brush your teeth. This can keep your skin and gums from bleeding. If you shave, use an electric shaver. Do not play contact sports.    Tell your dentist and other healthcare providers that you take a blood thinner. Wear a bracelet or necklace that says you take this medicine.    Do not start or stop any other medicines or supplements unless your healthcare provider tells you to. Many medicines and supplements cannot be used with blood thinners.    Take your blood thinner exactly as prescribed by your healthcare provider. Do not skip does or take less than prescribed. Tell your provider right away if you forget to take your blood thinner, or if you take too much.    Antiplatelets, such as aspirin, help prevent blood clots. Take your antiplatelet medicine exactly as directed. These medicines make it more likely for you to bleed or bruise. If you are told to take aspirin, do not take acetaminophen or ibuprofen instead.    Take your medicine as directed. Contact your healthcare provider if you think your medicine is not helping or if you have side effects. Tell your provider if you are allergic to any medicine. Keep a list of the medicines, vitamins, and herbs you take. Include the amounts, and when and why you take them. Bring the list or the pill bottles to follow-up visits. Carry your medicine list with you in case of an emergency.  Manage A-fib:    Know your target heart rate. Learn how to check your pulse and monitor your heart rate.    Know the risks if you choose to drink alcohol. Alcohol can increase your risk for A-fib or make A-fib harder to manage. Ask your healthcare provider if it is okay for you to drink any alcohol. He or she can help you set limits for the number of drinks you have in 24 hours and in a week. A drink of alcohol is 12 ounces of beer, 5 ounces of wine, or 1½ ounces of liquor.    Do not smoke. Nicotine can cause heart damage and make it more difficult to manage your A-fib. Do not use e-cigarettes or smokeless tobacco in place of cigarettes or to help you quit. They still contain nicotine. Ask your healthcare provider for information if you currently smoke and need help quitting.    Eat heart-healthy foods. Heart healthy foods will help keep your cholesterol low. These include fruits, vegetables, whole-grain breads, low-fat dairy products, beans, lean meats, and fish. Replace butter and margarine with heart-healthy oils such as olive oil and canola oil    Maintain a healthy weight. Ask your healthcare provider what a healthy weight is for you. Ask him or her to help you create a safe weight loss plan if needed. Even a small goal of a 10% weight loss can improve your heart health.    Get regular physical activity. Physical activity helps improve your heart health. Get at least 150 minutes of moderate aerobic physical activity each week. Your healthcare provider can help you create an activity plan.    Manage other health conditions. This includes high blood pressure or cholesterol, sleep apnea, diabetes, and other heart conditions. Take medicine as directed and follow your treatment plan. Your healthcare provider may need to change a medicine you are taking if it is causing your A-fib. Do not stop taking any medicine unless directed by your provider.  Follow up with your doctor or cardiologist as directed: You will need regular blood tests and monitoring. Write down your questions so you remember to ask them during your visits.

## 2023-11-18 NOTE — ED PROVIDER NOTE - PROGRESS NOTE DETAILS
HR improved after diltiazem. recommend f/u with EP for continued rate control or cardioversion  I have discussed the discharge plan with the patient. The patient agrees with the plan, as discussed.  The patient understands Emergency Department diagnosis is a preliminary diagnosis often based on limited information and that the patient must adhere to the follow-up plan as discussed.  The patient understands that if the symptoms worsen the patient may return to the Emergency Department at any time for further evaluation and treatment.

## 2023-11-20 DIAGNOSIS — I48.91 UNSPECIFIED ATRIAL FIBRILLATION: ICD-10-CM

## 2023-11-20 DIAGNOSIS — E03.9 HYPOTHYROIDISM, UNSPECIFIED: ICD-10-CM

## 2023-11-20 DIAGNOSIS — R00.2 PALPITATIONS: ICD-10-CM

## 2023-11-20 DIAGNOSIS — R00.0 TACHYCARDIA, UNSPECIFIED: ICD-10-CM

## 2023-11-20 DIAGNOSIS — Z85.3 PERSONAL HISTORY OF MALIGNANT NEOPLASM OF BREAST: ICD-10-CM

## 2023-11-20 DIAGNOSIS — I10 ESSENTIAL (PRIMARY) HYPERTENSION: ICD-10-CM

## 2023-11-20 DIAGNOSIS — Z79.01 LONG TERM (CURRENT) USE OF ANTICOAGULANTS: ICD-10-CM

## 2023-11-21 PROBLEM — C50.919 MALIGNANT NEOPLASM OF UNSPECIFIED SITE OF UNSPECIFIED FEMALE BREAST: Chronic | Status: ACTIVE | Noted: 2023-11-18

## 2023-11-22 ENCOUNTER — APPOINTMENT (OUTPATIENT)
Dept: HEART AND VASCULAR | Facility: CLINIC | Age: 63
End: 2023-11-22
Payer: COMMERCIAL

## 2023-11-22 ENCOUNTER — NON-APPOINTMENT (OUTPATIENT)
Age: 63
End: 2023-11-22

## 2023-11-22 VITALS
DIASTOLIC BLOOD PRESSURE: 54 MMHG | HEIGHT: 72 IN | HEART RATE: 57 BPM | OXYGEN SATURATION: 96 % | SYSTOLIC BLOOD PRESSURE: 92 MMHG | TEMPERATURE: 98.1 F | WEIGHT: 220 LBS | BODY MASS INDEX: 29.8 KG/M2

## 2023-11-22 DIAGNOSIS — E03.9 HYPOTHYROIDISM, UNSPECIFIED: ICD-10-CM

## 2023-11-22 PROCEDURE — 99213 OFFICE O/P EST LOW 20 MIN: CPT | Mod: 25

## 2023-11-22 PROCEDURE — 93000 ELECTROCARDIOGRAM COMPLETE: CPT

## 2023-11-22 PROCEDURE — 36415 COLL VENOUS BLD VENIPUNCTURE: CPT

## 2023-11-27 ENCOUNTER — TRANSCRIPTION ENCOUNTER (OUTPATIENT)
Age: 63
End: 2023-11-27

## 2023-11-27 LAB — TSH SERPL-ACNC: 0.02 UIU/ML

## 2023-12-26 ENCOUNTER — NON-APPOINTMENT (OUTPATIENT)
Age: 63
End: 2023-12-26

## 2023-12-26 ENCOUNTER — APPOINTMENT (OUTPATIENT)
Dept: HEART AND VASCULAR | Facility: CLINIC | Age: 63
End: 2023-12-26
Payer: COMMERCIAL

## 2023-12-26 VITALS
OXYGEN SATURATION: 98 % | DIASTOLIC BLOOD PRESSURE: 58 MMHG | SYSTOLIC BLOOD PRESSURE: 119 MMHG | HEIGHT: 72 IN | BODY MASS INDEX: 29.66 KG/M2 | WEIGHT: 219 LBS | TEMPERATURE: 95.4 F | HEART RATE: 57 BPM

## 2023-12-26 PROCEDURE — 99214 OFFICE O/P EST MOD 30 MIN: CPT | Mod: 25

## 2023-12-26 PROCEDURE — 93000 ELECTROCARDIOGRAM COMPLETE: CPT

## 2023-12-28 NOTE — ADDENDUM
[FreeTextEntry1] : I, Dajuan Casey, am scribing for and the presence of Dr. Collado the following sections: HPI, PMH,Family/social history, ROS, Physical Exam, Assessment / Plan.  I, Will Collado, personally performed the services described in the documentation, reviewed the documentation recorded by the scribe in my presence and it accurately and completely records my words and actions.

## 2023-12-28 NOTE — REVIEW OF SYSTEMS
[Negative] : Heme/Lymph [Fever] : no fever [Headache] : no headache [Chills] : no chills [Feeling Fatigued] : not feeling fatigued [SOB] : no shortness of breath [Dyspnea on exertion] : not dyspnea during exertion [Palpitations] : palpitations [Syncope] : no syncope

## 2023-12-28 NOTE — HISTORY OF PRESENT ILLNESS
[FreeTextEntry1] : 63 y.o. female with h/o small invasive duct R breast CA s/p lumpectomy/lymph node biopsy 4/11/2023, PAF s/p CRYO 10/24/22 who presents for follow-up.    She had stopped Metoprolol as she was doing very well.  She then had recurrent AFib in early May that lasted for several hours.  Presented to [facility] ED and was rate controlled; spontaneously converted to sinus rhythm.  She states again 11/2023 she was in the ER with palpitations and converted to NSR.  She has not had any recurrence and is feeling very well.  She is tolerating Eliquis without bleeding issues.  She has continued Metoprolol. TSH recently showing hyperthyroidism.  Currently undergoing RT for breast CA.

## 2023-12-28 NOTE — CARDIOLOGY SUMMARY
[de-identified] : 12/26/23 sinus at 54bpm 10/24/2023 SB @ 53 bpm  6/6/23 SB @ 51 bpm  4/28/2023: SB 53 bpm 11/22/22: Sinus bradycardia @ 46 bp 12/26/24 sinus at 54bpm

## 2024-01-09 ENCOUNTER — APPOINTMENT (OUTPATIENT)
Dept: HEART AND VASCULAR | Facility: CLINIC | Age: 64
End: 2024-01-09
Payer: SELF-PAY

## 2024-01-09 VITALS
SYSTOLIC BLOOD PRESSURE: 122 MMHG | HEART RATE: 74 BPM | HEIGHT: 72 IN | BODY MASS INDEX: 29.66 KG/M2 | OXYGEN SATURATION: 98 % | DIASTOLIC BLOOD PRESSURE: 75 MMHG | WEIGHT: 219 LBS

## 2024-01-09 DIAGNOSIS — R94.31 ABNORMAL ELECTROCARDIOGRAM [ECG] [EKG]: ICD-10-CM

## 2024-01-09 DIAGNOSIS — I47.10 SUPRAVENTRICULAR TACHYCARDIA, UNSPECIFIED: ICD-10-CM

## 2024-01-09 DIAGNOSIS — I10 ESSENTIAL (PRIMARY) HYPERTENSION: ICD-10-CM

## 2024-01-09 PROCEDURE — 99213 OFFICE O/P EST LOW 20 MIN: CPT

## 2024-01-09 NOTE — ASSESSMENT
[FreeTextEntry1] :  PAF- On Eliquis and BB, I discourage Amio that was given to her elsewhere. Ablation discussed for both arrhythmias. 2 bouts of A Fib, s/p ablation Oct 2022. Currently doing well on BB and Eliquis. Bout of AF May 6th after stopping Lopressor. Converted back to NSR on her own. Back on Lopressor. 8/1 -no recurrence of afib  Cleared for Exercise Program- Pt may participate in an exercise program/study @ [facility] [place]. There are no contraindications to exercise.  PreOp- Cleared for breast surgery 4/11/23 @ [facility] [place] with Dr Fritz. Will hold Eliquis for 7 doses,3 days before plus the morning of the surgery.  PSVT- Seen on Peerbridge Monitor  Mild to Moderate MR- Seen on Echo Sept 2021. BP controlled. 8/1 -will repeat echo  Chest tightness -8/1/23 -CTA done. CAC -0. low risk for cardiac CP. will monitor. (Had RT to right breast only)  Health Maintenance- colonoscopy Oct 2021 with Dr Lopez.

## 2024-01-09 NOTE — HISTORY OF PRESENT ILLNESS
[FreeTextEntry1] : 63 y.o. female with h/o small invasive duct R breast CA s/p lumpectomy/lymph node biopsy 4/11/2023, PAF s/p CRYO 10/24/22 who presents for follow-up.    She had stopped Metoprolol as she was doing very well.  She then had recurrent AFib in early May that lasted for several hours.  Presented to [facility] ED and was rate controlled; spontaneously converted to sinus rhythm.  She states again 11/2023 she was in the ER with palpitations and converted to NSR.  She has not had any recurrence and is feeling very well.  She is tolerating Eliquis without bleeding issues.  She has continued Metoprolol. TSH recently showing hyperthyroidism.  Currently undergoing RT for breast CA.  1/9/24  Doing well.  On lower dose of thyroid pill.

## 2024-01-09 NOTE — PHYSICAL EXAM
[Well Developed] : well developed [Well Nourished] : well nourished [No Acute Distress] : no acute distress [Normal Conjunctiva] : normal conjunctiva [Normal Venous Pressure] : normal venous pressure [Normal S1, S2] : normal S1, S2 [No Murmur] : no murmur [Clear Lung Fields] : clear lung fields [Good Air Entry] : good air entry [No Respiratory Distress] : no respiratory distress  [Soft] : abdomen soft [Non Tender] : non-tender [Normal Gait] : normal gait [No Edema] : no edema [No Rash] : no rash [Moves all extremities] : moves all extremities [Normal Speech] : normal speech [Alert and Oriented] : alert and oriented

## 2024-01-09 NOTE — REVIEW OF SYSTEMS
[Palpitations] : palpitations [Negative] : Heme/Lymph [Fever] : no fever [Headache] : no headache [Chills] : no chills [Feeling Fatigued] : not feeling fatigued [SOB] : no shortness of breath [Dyspnea on exertion] : not dyspnea during exertion [Syncope] : no syncope

## 2024-01-09 NOTE — CARDIOLOGY SUMMARY
[de-identified] : 12/26/23 sinus at 54bpm 10/24/2023 SB @ 53 bpm  6/6/23 SB @ 51 bpm  4/28/2023: SB 53 bpm 11/22/22: Sinus bradycardia @ 46 bp 12/26/24 sinus at 54bpm

## 2024-01-16 NOTE — REASON FOR VISIT
Mom sends e-advice and photos of patient's eye drainage. Mom reports he had a recent cough with a fever and congestion. Eye drainage began a week ago.     Please advise.   OK to treat per protocol or would you like him seen to check ears etc.?   Thanks!              [Other: ____] : [unfilled] [FreeTextEntry1] : 62yo F,   with history of hypertension, hypothyroidism and paroxysmal atrial fibrillation on Eliquis returns to clinic for unscheduled follow up. As previously documented episodes of atrial fibrillation are few (3x/6 months) but bothersome when they occur. Presently taking Metoprolol 12.5 qd with an avg HR 50-55 bpm in sinus rhythm at baseline, use of Propranolol 10mg has been reserved for breakthrough episodes of atrial fibrillation where heart rates can reach 110-130 bpm. She has established care with EP and is being evaluated for an ablation. CT scan performed last week. During most recent episode of atrial fibrillation lasted 3 hours. Metoprolol 12.5 qd + Propranolol 10 qd were ineffective at stopping the rhythm. She is requesting an alternative to Propranolol. \par \par SHx: denies smoking or alcohol. Employed in IT. Lives with independently.\par Allergies: Eggplant, Hazelnut.  \par Meds: Metorpolol 12.5 qd, Lveothyroxine 175 qd, Eliquis 5 qd, Propranolol 10 prn, Vit D3 supplements. \par

## 2024-03-28 ENCOUNTER — TRANSCRIPTION ENCOUNTER (OUTPATIENT)
Age: 64
End: 2024-03-28

## 2024-04-30 ENCOUNTER — APPOINTMENT (OUTPATIENT)
Dept: HEART AND VASCULAR | Facility: CLINIC | Age: 64
End: 2024-04-30
Payer: COMMERCIAL

## 2024-04-30 ENCOUNTER — NON-APPOINTMENT (OUTPATIENT)
Age: 64
End: 2024-04-30

## 2024-04-30 VITALS
OXYGEN SATURATION: 93 % | HEIGHT: 72 IN | BODY MASS INDEX: 29.16 KG/M2 | WEIGHT: 215.25 LBS | DIASTOLIC BLOOD PRESSURE: 57 MMHG | HEART RATE: 66 BPM | SYSTOLIC BLOOD PRESSURE: 120 MMHG

## 2024-04-30 DIAGNOSIS — R00.2 PALPITATIONS: ICD-10-CM

## 2024-04-30 DIAGNOSIS — I48.0 PAROXYSMAL ATRIAL FIBRILLATION: ICD-10-CM

## 2024-04-30 DIAGNOSIS — Z79.01 LONG TERM (CURRENT) USE OF ANTICOAGULANTS: ICD-10-CM

## 2024-04-30 PROCEDURE — 99213 OFFICE O/P EST LOW 20 MIN: CPT | Mod: 25

## 2024-04-30 PROCEDURE — 93000 ELECTROCARDIOGRAM COMPLETE: CPT

## 2024-04-30 RX ORDER — LEVOTHYROXINE SODIUM 150 UG/1
150 TABLET ORAL DAILY
Refills: 0 | Status: ACTIVE | COMMUNITY
Start: 2022-02-09

## 2024-04-30 NOTE — HISTORY OF PRESENT ILLNESS
[FreeTextEntry1] : 63 y.o. female with h/o small invasive duct R breast CA s/p lumpectomy/lymph node biopsy 4/11/2023, PAF s/p CRYO 10/24/22 who presents for follow-up.    She had stopped Metoprolol as she was doing very well.  She then had recurrent AFib in early May that lasted for several hours.  Presented to [facility] ED and was rate controlled; spontaneously converted to sinus rhythm.  She states again 11/2023 she was in the ER with palpitations and converted to NSR.  TSH 12/2023 showed hyperthyroidism-- Levothyroxine dose was lowered but she felt fatigued so the dose has been raised back to 150 mcg.  One brief episode of palpitations that lasted for a few seconds last night.  Otherwise no palpitations, CP, SOB/ALFRED, presyncope/syncope.  Tolerating Eliquis without bleeding issues.  Still taking Metoprolol.  S/P XRT for breast CA.

## 2024-04-30 NOTE — CARDIOLOGY SUMMARY
[de-identified] : 4/30/24 SR @ 62 bpm  12/26/23 sinus at 54bpm 10/24/2023 SB @ 53 bpm  6/6/23 SB @ 51 bpm  4/28/2023: SB 53 bpm 11/22/22: Sinus bradycardia @ 46 bp 12/26/24 sinus at 54bpm

## 2024-04-30 NOTE — ADDENDUM
[FreeTextEntry1] : I, Marily Ruff, am scribing for and the presence of Dr. Collado the following sections: HPI, PMH,Family/social history, ROS, Physical Exam, Assessment / Plan.  I, Will Collado, personally performed the services described in the documentation, reviewed the documentation recorded by the scribe in my presence and it accurately and completely records my words and actions.

## 2024-04-30 NOTE — REVIEW OF SYSTEMS
[Fever] : no fever [Headache] : no headache [Chills] : no chills [Feeling Fatigued] : not feeling fatigued [SOB] : no shortness of breath [Dyspnea on exertion] : not dyspnea during exertion [Palpitations] : palpitations [Syncope] : no syncope [Negative] : Heme/Lymph

## 2024-05-28 RX ORDER — APIXABAN 5 MG/1
5 TABLET, FILM COATED ORAL
Qty: 180 | Refills: 3 | Status: ACTIVE | COMMUNITY
Start: 2023-03-13 | End: 1900-01-01

## 2024-06-28 ENCOUNTER — NON-APPOINTMENT (OUTPATIENT)
Age: 64
End: 2024-06-28

## 2024-08-02 ENCOUNTER — RX RENEWAL (OUTPATIENT)
Age: 64
End: 2024-08-02

## 2024-08-14 ENCOUNTER — APPOINTMENT (OUTPATIENT)
Dept: HEART AND VASCULAR | Facility: CLINIC | Age: 64
End: 2024-08-14
Payer: COMMERCIAL

## 2024-08-14 VITALS
TEMPERATURE: 98.1 F | HEIGHT: 72 IN | WEIGHT: 217 LBS | BODY MASS INDEX: 29.39 KG/M2 | OXYGEN SATURATION: 98 % | SYSTOLIC BLOOD PRESSURE: 122 MMHG | HEART RATE: 70 BPM | DIASTOLIC BLOOD PRESSURE: 73 MMHG

## 2024-08-14 DIAGNOSIS — R94.31 ABNORMAL ELECTROCARDIOGRAM [ECG] [EKG]: ICD-10-CM

## 2024-08-14 DIAGNOSIS — I10 ESSENTIAL (PRIMARY) HYPERTENSION: ICD-10-CM

## 2024-08-14 DIAGNOSIS — I47.10 SUPRAVENTRICULAR TACHYCARDIA, UNSPECIFIED: ICD-10-CM

## 2024-08-14 DIAGNOSIS — I48.0 PAROXYSMAL ATRIAL FIBRILLATION: ICD-10-CM

## 2024-08-14 DIAGNOSIS — Z01.810 ENCOUNTER FOR PREPROCEDURAL CARDIOVASCULAR EXAMINATION: ICD-10-CM

## 2024-08-14 PROCEDURE — 99214 OFFICE O/P EST MOD 30 MIN: CPT

## 2024-08-14 NOTE — HISTORY OF PRESENT ILLNESS
[FreeTextEntry1] : 63 y.o. female with h/o small invasive duct R breast CA s/p lumpectomy/lymph node biopsy 4/11/2023, PAF s/p CRYO 10/24/22 who presents for follow-up.    She had stopped Metoprolol as she was doing very well.  She then had recurrent AFib in early May that lasted for several hours.  Presented to [facility] ED and was rate controlled; spontaneously converted to sinus rhythm.  She states again 11/2023 she was in the ER with palpitations and converted to NSR.  She has not had any recurrence and is feeling very well.  She is tolerating Eliquis without bleeding issues.  She has continued Metoprolol. TSH recently showing hyperthyroidism.  Currently undergoing RT for breast CA.  1/9/24  Doing well.  On lower dose of thyroid pill. 8/14/24  Fell off step ladder fx Left humerus.  Surgery tomorrow with Dr Bernabe Lehman @  surgery center.  Needs preop clearance, has no paperwork. CBC and chems OK in ER 8/7/24.  EKG last done April 30, 2024.    Andra Jeffries

## 2024-08-14 NOTE — CARDIOLOGY SUMMARY
[de-identified] : 12/26/23 sinus at 54bpm 10/24/2023 SB @ 53 bpm  6/6/23 SB @ 51 bpm  4/28/2023: SB 53 bpm 11/22/22: Sinus bradycardia @ 46 bp 12/26/24 sinus at 54bpm

## 2024-08-14 NOTE — ASSESSMENT
[FreeTextEntry1] :  PAF- On Eliquis and BB, I discourage Amio that was given to her elsewhere. Ablation discussed for both arrhythmias. 2 bouts of A Fib, s/p ablation Oct 2022. Currently doing well on BB and Eliquis. Bout of AF May 6th after stopping Lopressor. Converted back to NSR on her own. Back on Lopressor. 8/1 -no recurrence of afib  Cleared for Exercise Program- Pt may participate in an exercise program/study [place] [facility] [place]. There are no contraindications to exercise.  PreOp- s/p breast surgery 4/11/23 [place] [facility] [place] Fritz. Held Eliquis for 7 doses,3 days before plus the morning of the surgery.  Cleared for left humoral surgery.  Last Eliquis dose was Monday night.  Cleared to proceed.  Watch for A Fib, continue Metoprolol.  PSVT- Seen on Peerbridge Monitor  Mild to Moderate MR- Seen on Echo Sept 2021. BP controlled. 8/1 -will repeat echo  Chest tightness -8/1/23 -CTA done. CAC -0. low risk for cardiac CP. will monitor. (Had RT to right breast only)  Health Maintenance- colonoscopy Oct 2021 with Dr Lopez.

## 2024-08-15 NOTE — PROGRESS NOTE ADULT - PROBLEM SELECTOR PROBLEM 3
Borderline hypertension General Sunscreen Counseling: I recommended a broad spectrum sunscreen with a SPF of 30 or higher.  I explained that SPF 30 sunscreens block approximately 97 percent of the sun's harmful rays.  Sunscreens should be applied at least 15 minutes prior to expected sun exposure and then every 2 hours after that as long as sun exposure continues. If swimming or exercising sunscreen should be reapplied every 45 minutes to an hour after getting wet or sweating.  One ounce, or the equivalent of a shot glass full of sunscreen, is adequate to protect the skin not covered by a bathing suit. I also recommended a lip balm with a sunscreen as well. Sun protective clothing can be used in lieu of sunscreen but must be worn the entire time you are exposed to the sun's rays. Products Recommended: Elta UV Clear for daily use recommended Detail Level: Detailed

## 2024-09-08 NOTE — DISCHARGE NOTE NURSING/CASE MANAGEMENT/SOCIAL WORK - WAS YOUR LAST COVID-19 VACCINE GREATER THAN OR EQUAL TO TWO MONTHS AGO?
Yes FAMILY HISTORY:  Father  Still living? Unknown  HTN (hypertension), Age at diagnosis: Age Unknown    Mother  Still living? No  HTN (hypertension), Age at diagnosis: Age Unknown

## 2024-12-09 ENCOUNTER — TRANSCRIPTION ENCOUNTER (OUTPATIENT)
Age: 64
End: 2024-12-09

## 2025-01-15 ENCOUNTER — NON-APPOINTMENT (OUTPATIENT)
Age: 65
End: 2025-01-15

## 2025-01-18 ENCOUNTER — NON-APPOINTMENT (OUTPATIENT)
Age: 65
End: 2025-01-18

## 2025-01-23 ENCOUNTER — APPOINTMENT (OUTPATIENT)
Dept: HEART AND VASCULAR | Facility: CLINIC | Age: 65
End: 2025-01-23
Payer: MEDICARE

## 2025-01-23 ENCOUNTER — NON-APPOINTMENT (OUTPATIENT)
Age: 65
End: 2025-01-23

## 2025-01-23 VITALS — WEIGHT: 221 LBS | HEIGHT: 72 IN | TEMPERATURE: 97.5 F | BODY MASS INDEX: 29.93 KG/M2

## 2025-01-23 VITALS — SYSTOLIC BLOOD PRESSURE: 100 MMHG | DIASTOLIC BLOOD PRESSURE: 66 MMHG | HEART RATE: 55 BPM

## 2025-01-23 DIAGNOSIS — I48.0 PAROXYSMAL ATRIAL FIBRILLATION: ICD-10-CM

## 2025-01-23 DIAGNOSIS — R00.2 PALPITATIONS: ICD-10-CM

## 2025-01-23 DIAGNOSIS — Z79.01 LONG TERM (CURRENT) USE OF ANTICOAGULANTS: ICD-10-CM

## 2025-01-23 PROCEDURE — 93000 ELECTROCARDIOGRAM COMPLETE: CPT

## 2025-01-23 PROCEDURE — 99204 OFFICE O/P NEW MOD 45 MIN: CPT

## 2025-02-12 ENCOUNTER — APPOINTMENT (OUTPATIENT)
Dept: HEART AND VASCULAR | Facility: CLINIC | Age: 65
End: 2025-02-12
Payer: MEDICARE

## 2025-02-12 VITALS
BODY MASS INDEX: 29.8 KG/M2 | SYSTOLIC BLOOD PRESSURE: 102 MMHG | OXYGEN SATURATION: 98 % | HEART RATE: 60 BPM | DIASTOLIC BLOOD PRESSURE: 64 MMHG | HEIGHT: 72 IN | TEMPERATURE: 97.1 F | WEIGHT: 220.03 LBS

## 2025-02-12 DIAGNOSIS — I10 ESSENTIAL (PRIMARY) HYPERTENSION: ICD-10-CM

## 2025-02-12 DIAGNOSIS — I47.10 SUPRAVENTRICULAR TACHYCARDIA, UNSPECIFIED: ICD-10-CM

## 2025-02-12 DIAGNOSIS — I48.0 PAROXYSMAL ATRIAL FIBRILLATION: ICD-10-CM

## 2025-02-12 DIAGNOSIS — R94.31 ABNORMAL ELECTROCARDIOGRAM [ECG] [EKG]: ICD-10-CM

## 2025-02-12 PROCEDURE — 99204 OFFICE O/P NEW MOD 45 MIN: CPT

## 2025-04-11 NOTE — PRE-ANESTHESIA EVALUATION ADULT - NSANTHOSAYNRD_GEN_A_CORE
1 No. SHIRAZ screening performed.  STOP BANG Legend: 0-2 = LOW Risk; 3-4 = INTERMEDIATE Risk; 5-8 = HIGH Risk

## 2025-04-21 ENCOUNTER — TRANSCRIPTION ENCOUNTER (OUTPATIENT)
Age: 65
End: 2025-04-21

## 2025-07-08 NOTE — ED ADULT NURSE NOTE - NS ED NURSE PATIENT LEFT UNIT TIME
The INR is below the therapeutic range.  Please make the following adjustments to Coumadin dosing: Patient know no reason why INR is low.  Repeat the INR in 1 week.    04:38

## 2025-07-18 ENCOUNTER — RX RENEWAL (OUTPATIENT)
Age: 65
End: 2025-07-18

## 2025-07-22 ENCOUNTER — RX RENEWAL (OUTPATIENT)
Age: 65
End: 2025-07-22

## 2025-07-30 ENCOUNTER — TRANSCRIPTION ENCOUNTER (OUTPATIENT)
Age: 65
End: 2025-07-30

## 2025-09-06 ENCOUNTER — RX RENEWAL (OUTPATIENT)
Age: 65
End: 2025-09-06

## 2025-09-08 ENCOUNTER — TRANSCRIPTION ENCOUNTER (OUTPATIENT)
Age: 65
End: 2025-09-08